# Patient Record
Sex: MALE | Race: WHITE | NOT HISPANIC OR LATINO | Employment: OTHER | URBAN - METROPOLITAN AREA
[De-identification: names, ages, dates, MRNs, and addresses within clinical notes are randomized per-mention and may not be internally consistent; named-entity substitution may affect disease eponyms.]

---

## 2017-01-24 DIAGNOSIS — Z95.5 S/P CORONARY ARTERY STENT PLACEMENT: ICD-10-CM

## 2017-01-24 DIAGNOSIS — I25.10 CORONARY ARTERY DISEASE INVOLVING NATIVE CORONARY ARTERY OF NATIVE HEART WITHOUT ANGINA PECTORIS: ICD-10-CM

## 2017-01-24 DIAGNOSIS — I25.5 CARDIOMYOPATHY, ISCHEMIC: ICD-10-CM

## 2017-01-24 DIAGNOSIS — E78.5 DYSLIPIDEMIA: ICD-10-CM

## 2017-01-24 DIAGNOSIS — I21.09: ICD-10-CM

## 2017-01-25 RX ORDER — ATORVASTATIN CALCIUM 20 MG/1
20 TABLET, FILM COATED ORAL DAILY
Qty: 90 TABLET | Refills: 3 | Status: SHIPPED | OUTPATIENT
Start: 2017-01-25 | End: 2017-06-22 | Stop reason: SDUPTHER

## 2017-02-27 ENCOUNTER — LAB VISIT (OUTPATIENT)
Dept: LAB | Facility: HOSPITAL | Age: 62
End: 2017-02-27
Attending: UROLOGY
Payer: MEDICARE

## 2017-02-27 DIAGNOSIS — N13.8 HYPERTROPHY OF PROSTATE WITH URINARY OBSTRUCTION AND OTHER LOWER URINARY TRACT SYMPTOMS (LUTS): Primary | ICD-10-CM

## 2017-02-27 DIAGNOSIS — N40.1 HYPERTROPHY OF PROSTATE WITH URINARY OBSTRUCTION AND OTHER LOWER URINARY TRACT SYMPTOMS (LUTS): Primary | ICD-10-CM

## 2017-02-27 LAB
COMPLEXED PSA SERPL-MCNC: 1.4 NG/ML
TESTOST SERPL-MCNC: 350 NG/DL

## 2017-02-27 PROCEDURE — 84153 ASSAY OF PSA TOTAL: CPT

## 2017-02-27 PROCEDURE — 84403 ASSAY OF TOTAL TESTOSTERONE: CPT

## 2017-02-27 PROCEDURE — 36415 COLL VENOUS BLD VENIPUNCTURE: CPT | Mod: PO

## 2017-06-19 ENCOUNTER — CLINICAL SUPPORT (OUTPATIENT)
Dept: CARDIOLOGY | Facility: CLINIC | Age: 62
End: 2017-06-19
Payer: MEDICARE

## 2017-06-19 ENCOUNTER — LAB VISIT (OUTPATIENT)
Dept: LAB | Facility: HOSPITAL | Age: 62
End: 2017-06-19
Attending: INTERNAL MEDICINE
Payer: MEDICARE

## 2017-06-19 DIAGNOSIS — I25.10 CORONARY ARTERY DISEASE INVOLVING NATIVE CORONARY ARTERY OF NATIVE HEART WITHOUT ANGINA PECTORIS: ICD-10-CM

## 2017-06-19 DIAGNOSIS — E78.5 DYSLIPIDEMIA: ICD-10-CM

## 2017-06-19 DIAGNOSIS — Z95.5 S/P CORONARY ARTERY STENT PLACEMENT: ICD-10-CM

## 2017-06-19 DIAGNOSIS — R55 SYNCOPE, UNSPECIFIED SYNCOPE TYPE: ICD-10-CM

## 2017-06-19 DIAGNOSIS — I25.5 CARDIOMYOPATHY, ISCHEMIC: ICD-10-CM

## 2017-06-19 LAB
ALBUMIN SERPL BCP-MCNC: 3.4 G/DL
ALP SERPL-CCNC: 59 U/L
ALT SERPL W/O P-5'-P-CCNC: 19 U/L
ANION GAP SERPL CALC-SCNC: 11 MMOL/L
AORTIC VALVE REGURGITATION: NORMAL
AST SERPL-CCNC: 26 U/L
BILIRUB SERPL-MCNC: 0.7 MG/DL
BUN SERPL-MCNC: 18 MG/DL
CALCIUM SERPL-MCNC: 9 MG/DL
CHLORIDE SERPL-SCNC: 106 MMOL/L
CHOLEST/HDLC SERPL: 4.3 {RATIO}
CO2 SERPL-SCNC: 25 MMOL/L
CREAT SERPL-MCNC: 1.2 MG/DL
DIASTOLIC DYSFUNCTION: NO
EST. GFR  (AFRICAN AMERICAN): >60 ML/MIN/1.73 M^2
EST. GFR  (NON AFRICAN AMERICAN): >60 ML/MIN/1.73 M^2
ESTIMATED PA SYSTOLIC PRESSURE: 24.4
GLUCOSE SERPL-MCNC: 147 MG/DL
HDL/CHOLESTEROL RATIO: 23.5 %
HDLC SERPL-MCNC: 166 MG/DL
HDLC SERPL-MCNC: 39 MG/DL
LDLC SERPL CALC-MCNC: 111 MG/DL
NONHDLC SERPL-MCNC: 127 MG/DL
POTASSIUM SERPL-SCNC: 4.5 MMOL/L
PROT SERPL-MCNC: 6.4 G/DL
RETIRED EF AND QEF - SEE NOTES: 55 (ref 55–65)
SODIUM SERPL-SCNC: 142 MMOL/L
TRICUSPID VALVE REGURGITATION: NORMAL
TRIGL SERPL-MCNC: 80 MG/DL

## 2017-06-19 PROCEDURE — 36415 COLL VENOUS BLD VENIPUNCTURE: CPT | Mod: PO

## 2017-06-19 PROCEDURE — 93306 TTE W/DOPPLER COMPLETE: CPT | Mod: S$GLB,,, | Performed by: INTERNAL MEDICINE

## 2017-06-19 PROCEDURE — 80053 COMPREHEN METABOLIC PANEL: CPT

## 2017-06-19 PROCEDURE — 80061 LIPID PANEL: CPT

## 2017-06-20 ENCOUNTER — TELEPHONE (OUTPATIENT)
Dept: CARDIOLOGY | Facility: CLINIC | Age: 62
End: 2017-06-20

## 2017-06-22 ENCOUNTER — OFFICE VISIT (OUTPATIENT)
Dept: CARDIOLOGY | Facility: CLINIC | Age: 62
End: 2017-06-22
Payer: MEDICARE

## 2017-06-22 VITALS
DIASTOLIC BLOOD PRESSURE: 80 MMHG | BODY MASS INDEX: 34.94 KG/M2 | HEIGHT: 70 IN | SYSTOLIC BLOOD PRESSURE: 138 MMHG | WEIGHT: 244.06 LBS | HEART RATE: 70 BPM

## 2017-06-22 DIAGNOSIS — E78.5 DYSLIPIDEMIA: ICD-10-CM

## 2017-06-22 DIAGNOSIS — I25.5 CARDIOMYOPATHY, ISCHEMIC: ICD-10-CM

## 2017-06-22 DIAGNOSIS — I21.09: ICD-10-CM

## 2017-06-22 DIAGNOSIS — I25.10 CORONARY ARTERY DISEASE INVOLVING NATIVE CORONARY ARTERY OF NATIVE HEART WITHOUT ANGINA PECTORIS: ICD-10-CM

## 2017-06-22 DIAGNOSIS — Z95.5 S/P CORONARY ARTERY STENT PLACEMENT: Primary | ICD-10-CM

## 2017-06-22 PROCEDURE — 99999 PR PBB SHADOW E&M-EST. PATIENT-LVL III: CPT | Mod: PBBFAC,,, | Performed by: INTERNAL MEDICINE

## 2017-06-22 PROCEDURE — 99214 OFFICE O/P EST MOD 30 MIN: CPT | Mod: S$GLB,,, | Performed by: INTERNAL MEDICINE

## 2017-06-22 PROCEDURE — 99499 UNLISTED E&M SERVICE: CPT | Mod: S$GLB,,, | Performed by: INTERNAL MEDICINE

## 2017-06-22 RX ORDER — NITROGLYCERIN 0.4 MG/1
0.4 TABLET SUBLINGUAL EVERY 5 MIN PRN
Qty: 25 TABLET | Refills: 5 | Status: SHIPPED | OUTPATIENT
Start: 2017-06-22 | End: 2018-07-25 | Stop reason: SDUPTHER

## 2017-06-22 RX ORDER — ATORVASTATIN CALCIUM 40 MG/1
40 TABLET, FILM COATED ORAL DAILY
Qty: 90 TABLET | Refills: 5 | Status: SHIPPED | OUTPATIENT
Start: 2017-06-22 | End: 2018-06-23 | Stop reason: SDUPTHER

## 2017-06-22 NOTE — PROGRESS NOTES
Patient, Tam Koo (MRN #[id number]), presented with a recorded BMI of 35.02 kg/m^2 and a documented comorbidity(s):  - Hyperlipidemia  to which the severe obesity is a contributing factor. This is consistent with the definition of severe obesity (BMI 35.0-35.9) with comorbidity (ICD-10 E66.01, Z68.35). The patient's severe obesity was monitored, evaluated, addressed and/or treated. This addendum to the medical record is made on 06/22/2017.

## 2017-06-22 NOTE — PROGRESS NOTES
Subjective:    Patient ID:  Tam Koo is a 61 y.o. male who presents for evaluation of No chief complaint on file.      HPI   Here for follow up of CAD-PCI/STEMI 9/2015/syncope. Patients states is doing well no chest pain, SOB or change in exertional tolerence. Patient does not exercise but remains very active with out change in exertional tolerance or chest pain. Very active.       Review of Systems   Constitution: Negative for decreased appetite and malaise/fatigue.   HENT: Negative for congestion and nosebleeds.    Eyes: Negative for blurred vision.   Cardiovascular: Negative for chest pain, claudication, cyanosis, dyspnea on exertion, irregular heartbeat, leg swelling, near-syncope, orthopnea, palpitations, paroxysmal nocturnal dyspnea and syncope.   Respiratory: Negative for cough and shortness of breath.    Endocrine: Negative for polyuria.   Hematologic/Lymphatic: Does not bruise/bleed easily.   Musculoskeletal: Negative for back pain, falls, joint pain, joint swelling, muscle cramps, muscle weakness and myalgias.   Gastrointestinal: Negative for bloating, abdominal pain, change in bowel habit, nausea and vomiting.   Genitourinary: Negative for urgency.   Neurological: Negative for dizziness, focal weakness and light-headedness.   Psychiatric/Behavioral: Negative for altered mental status.        Objective:    Physical Exam   Constitutional: He is oriented to person, place, and time. He appears well-developed and well-nourished.   Neck: Normal range of motion. Neck supple. No JVD present.   Cardiovascular: Normal rate, regular rhythm, normal heart sounds and intact distal pulses.    Pulses:       Carotid pulses are 2+ on the right side, and 2+ on the left side.       Radial pulses are 2+ on the right side, and 2+ on the left side.   Pulmonary/Chest: Effort normal and breath sounds normal.   Musculoskeletal: Normal range of motion. He exhibits no edema.   Neurological: He is alert and oriented to person,  place, and time.   Skin: Skin is warm and dry.   Psychiatric: He has a normal mood and affect. His speech is normal. Cognition and memory are normal.             ..    Chemistry        Component Value Date/Time     06/19/2017 0733    K 4.5 06/19/2017 0733     06/19/2017 0733    CO2 25 06/19/2017 0733    BUN 18 06/19/2017 0733    CREATININE 1.2 06/19/2017 0733     (H) 06/19/2017 0733        Component Value Date/Time    CALCIUM 9.0 06/19/2017 0733    ALKPHOS 59 06/19/2017 0733    AST 26 06/19/2017 0733    ALT 19 06/19/2017 0733    BILITOT 0.7 06/19/2017 0733            ..  Lab Results   Component Value Date    CHOL 166 06/19/2017    CHOL 150 06/28/2016    CHOL 266 (H) 04/15/2015     Lab Results   Component Value Date    HDL 39 (L) 06/19/2017    HDL 55 06/28/2016    HDL 40 04/15/2015     Lab Results   Component Value Date    LDLCALC 111.0 06/19/2017    LDLCALC 82.6 06/28/2016    LDLCALC 190.4 (H) 04/15/2015     Lab Results   Component Value Date    TRIG 80 06/19/2017    TRIG 62 06/28/2016    TRIG 178 (H) 04/15/2015     Lab Results   Component Value Date    CHOLHDL 23.5 06/19/2017    CHOLHDL 36.7 06/28/2016    CHOLHDL 15.0 (L) 04/15/2015     ..  Lab Results   Component Value Date    WBC 4.94 04/15/2015    HGB 14.2 04/15/2015    HCT 44.7 04/15/2015    MCV 90 04/15/2015     04/15/2015       Test(s) Reviewed  I have reviewed the following in detail:  [] Stress test   [] Angiography   [x] Echocardiogram   [x] Labs   [x] Other:       Assessment:         ICD-10-CM ICD-9-CM   1. S/P coronary artery stent placement - LAD 09/2015 Z95.5 V45.82   2. Coronary artery disease involving native coronary artery of native heart without angina pectoris I25.10 414.01   3. Dyslipidemia E78.5 272.4   4. Cardiomyopathy, ischemic I25.5 414.8     Problem List Items Addressed This Visit     CAD (coronary artery disease)    Overview     9/18 LAD- occluded (STEMI), CX, RCA- LI.         Cardiomyopathy, ischemic    Overview      Resolved EF now 55%         Dyslipidemia    S/P coronary artery stent placement - LAD 09/2015 - Primary    Overview     9/2015 STEMI- rebel BMS 3c20 post 3.5           Other Visit Diagnoses    None.          Plan:     Return to clinic 1 year   Aerobic exercise 5x's/wk. Heart healthy diet and risk factor modification.    See labs and med orders.  Increase lipitor

## 2017-06-26 DIAGNOSIS — E78.5 DYSLIPIDEMIA: ICD-10-CM

## 2017-06-26 DIAGNOSIS — I25.5 CARDIOMYOPATHY, ISCHEMIC: ICD-10-CM

## 2017-06-26 DIAGNOSIS — R55 SYNCOPE, UNSPECIFIED SYNCOPE TYPE: Primary | ICD-10-CM

## 2017-06-26 DIAGNOSIS — I25.10 CORONARY ARTERY DISEASE, ANGINA PRESENCE UNSPECIFIED, UNSPECIFIED VESSEL OR LESION TYPE, UNSPECIFIED WHETHER NATIVE OR TRANSPLANTED HEART: ICD-10-CM

## 2018-06-19 ENCOUNTER — LAB VISIT (OUTPATIENT)
Dept: LAB | Facility: HOSPITAL | Age: 63
End: 2018-06-19
Attending: INTERNAL MEDICINE
Payer: MEDICARE

## 2018-06-19 DIAGNOSIS — I25.10 CORONARY ARTERY DISEASE, ANGINA PRESENCE UNSPECIFIED, UNSPECIFIED VESSEL OR LESION TYPE, UNSPECIFIED WHETHER NATIVE OR TRANSPLANTED HEART: ICD-10-CM

## 2018-06-19 DIAGNOSIS — E78.5 DYSLIPIDEMIA: ICD-10-CM

## 2018-06-19 DIAGNOSIS — I25.5 CARDIOMYOPATHY, ISCHEMIC: ICD-10-CM

## 2018-06-19 DIAGNOSIS — R55 SYNCOPE, UNSPECIFIED SYNCOPE TYPE: ICD-10-CM

## 2018-06-19 LAB
ALBUMIN SERPL BCP-MCNC: 3.6 G/DL
ALP SERPL-CCNC: 65 U/L
ALT SERPL W/O P-5'-P-CCNC: 20 U/L
ANION GAP SERPL CALC-SCNC: 8 MMOL/L
AST SERPL-CCNC: 18 U/L
BASOPHILS # BLD AUTO: 0.04 K/UL
BASOPHILS NFR BLD: 0.5 %
BILIRUB SERPL-MCNC: 0.7 MG/DL
BUN SERPL-MCNC: 22 MG/DL
CALCIUM SERPL-MCNC: 9.7 MG/DL
CHLORIDE SERPL-SCNC: 104 MMOL/L
CHOLEST SERPL-MCNC: 186 MG/DL
CHOLEST/HDLC SERPL: 3.6 {RATIO}
CO2 SERPL-SCNC: 29 MMOL/L
CREAT SERPL-MCNC: 1 MG/DL
DIFFERENTIAL METHOD: ABNORMAL
EOSINOPHIL # BLD AUTO: 0.4 K/UL
EOSINOPHIL NFR BLD: 4.8 %
ERYTHROCYTE [DISTWIDTH] IN BLOOD BY AUTOMATED COUNT: 13.2 %
EST. GFR  (AFRICAN AMERICAN): >60 ML/MIN/1.73 M^2
EST. GFR  (NON AFRICAN AMERICAN): >60 ML/MIN/1.73 M^2
GLUCOSE SERPL-MCNC: 95 MG/DL
HCT VFR BLD AUTO: 44.9 %
HDLC SERPL-MCNC: 52 MG/DL
HDLC SERPL: 28 %
HGB BLD-MCNC: 14 G/DL
IMM GRANULOCYTES # BLD AUTO: 0.02 K/UL
IMM GRANULOCYTES NFR BLD AUTO: 0.3 %
LDLC SERPL CALC-MCNC: 116.8 MG/DL
LYMPHOCYTES # BLD AUTO: 2.6 K/UL
LYMPHOCYTES NFR BLD: 34.5 %
MCH RBC QN AUTO: 29.5 PG
MCHC RBC AUTO-ENTMCNC: 31.2 G/DL
MCV RBC AUTO: 95 FL
MONOCYTES # BLD AUTO: 0.7 K/UL
MONOCYTES NFR BLD: 9.6 %
NEUTROPHILS # BLD AUTO: 3.7 K/UL
NEUTROPHILS NFR BLD: 50.3 %
NONHDLC SERPL-MCNC: 134 MG/DL
NRBC BLD-RTO: 0 /100 WBC
PLATELET # BLD AUTO: 202 K/UL
PMV BLD AUTO: 11.3 FL
POTASSIUM SERPL-SCNC: 4.2 MMOL/L
PROT SERPL-MCNC: 6.9 G/DL
RBC # BLD AUTO: 4.75 M/UL
SODIUM SERPL-SCNC: 141 MMOL/L
TRIGL SERPL-MCNC: 86 MG/DL
WBC # BLD AUTO: 7.43 K/UL

## 2018-06-19 PROCEDURE — 80061 LIPID PANEL: CPT

## 2018-06-19 PROCEDURE — 36415 COLL VENOUS BLD VENIPUNCTURE: CPT | Mod: PO

## 2018-06-19 PROCEDURE — 85025 COMPLETE CBC W/AUTO DIFF WBC: CPT

## 2018-06-19 PROCEDURE — 80053 COMPREHEN METABOLIC PANEL: CPT

## 2018-06-25 RX ORDER — ATORVASTATIN CALCIUM 40 MG/1
TABLET, FILM COATED ORAL
Qty: 90 TABLET | Refills: 4 | Status: SHIPPED | OUTPATIENT
Start: 2018-06-25 | End: 2018-06-26 | Stop reason: SDUPTHER

## 2018-06-26 ENCOUNTER — OFFICE VISIT (OUTPATIENT)
Dept: CARDIOLOGY | Facility: CLINIC | Age: 63
End: 2018-06-26
Payer: MEDICARE

## 2018-06-26 VITALS
BODY MASS INDEX: 34.94 KG/M2 | HEART RATE: 71 BPM | DIASTOLIC BLOOD PRESSURE: 82 MMHG | WEIGHT: 244.06 LBS | HEIGHT: 70 IN | SYSTOLIC BLOOD PRESSURE: 131 MMHG

## 2018-06-26 DIAGNOSIS — Z95.5 S/P CORONARY ARTERY STENT PLACEMENT: Primary | ICD-10-CM

## 2018-06-26 DIAGNOSIS — E78.5 DYSLIPIDEMIA: ICD-10-CM

## 2018-06-26 DIAGNOSIS — I25.10 CORONARY ARTERY DISEASE INVOLVING NATIVE CORONARY ARTERY OF NATIVE HEART WITHOUT ANGINA PECTORIS: ICD-10-CM

## 2018-06-26 DIAGNOSIS — I25.5 CARDIOMYOPATHY, ISCHEMIC: ICD-10-CM

## 2018-06-26 PROCEDURE — 3079F DIAST BP 80-89 MM HG: CPT | Mod: CPTII,S$GLB,, | Performed by: INTERNAL MEDICINE

## 2018-06-26 PROCEDURE — 3075F SYST BP GE 130 - 139MM HG: CPT | Mod: CPTII,S$GLB,, | Performed by: INTERNAL MEDICINE

## 2018-06-26 PROCEDURE — 3008F BODY MASS INDEX DOCD: CPT | Mod: CPTII,S$GLB,, | Performed by: INTERNAL MEDICINE

## 2018-06-26 PROCEDURE — 99999 PR PBB SHADOW E&M-EST. PATIENT-LVL III: CPT | Mod: PBBFAC,,, | Performed by: INTERNAL MEDICINE

## 2018-06-26 PROCEDURE — 99214 OFFICE O/P EST MOD 30 MIN: CPT | Mod: S$GLB,,, | Performed by: INTERNAL MEDICINE

## 2018-06-26 RX ORDER — ATORVASTATIN CALCIUM 20 MG/1
TABLET, FILM COATED ORAL
Qty: 90 TABLET | Refills: 5 | Status: SHIPPED | OUTPATIENT
Start: 2018-06-26 | End: 2019-05-21 | Stop reason: SDUPTHER

## 2018-06-26 RX ORDER — EZETIMIBE 10 MG/1
10 TABLET ORAL DAILY
Qty: 90 TABLET | Refills: 3 | Status: SHIPPED | OUTPATIENT
Start: 2018-06-26 | End: 2019-05-21 | Stop reason: SDUPTHER

## 2018-06-26 NOTE — PROGRESS NOTES
Subjective:    Patient ID:  Tam Koo is a 62 y.o. male who presents for follow-up of Coronary Artery Disease (Annual f/u - review labs ) and Cardiomyopathy      HPI  Here for follow up of CAD-PCI/STEMI 9/2015. Active man occasional CP w/ and w/o exertion.     Review of Systems   Constitution: Negative for malaise/fatigue.   Eyes: Negative for blurred vision.   Cardiovascular: Negative for chest pain, claudication, cyanosis, dyspnea on exertion, irregular heartbeat, leg swelling, near-syncope, orthopnea, palpitations, paroxysmal nocturnal dyspnea and syncope.   Respiratory: Negative for cough and shortness of breath.    Hematologic/Lymphatic: Does not bruise/bleed easily.   Musculoskeletal: Negative for back pain, falls, joint pain, muscle cramps, muscle weakness and myalgias.   Gastrointestinal: Negative for abdominal pain, change in bowel habit, nausea and vomiting.   Genitourinary: Negative for urgency.   Neurological: Negative for dizziness, focal weakness and light-headedness.        Objective:    Physical Exam   Constitutional: He is oriented to person, place, and time. He appears well-developed and well-nourished.   Neck: Normal range of motion. Neck supple. No JVD present.   Cardiovascular: Normal rate, regular rhythm, normal heart sounds and intact distal pulses.    Pulses:       Carotid pulses are 2+ on the right side, and 2+ on the left side.       Radial pulses are 2+ on the right side, and 2+ on the left side.   Pulmonary/Chest: Effort normal and breath sounds normal.   Musculoskeletal: Normal range of motion. He exhibits no edema.   Neurological: He is alert and oriented to person, place, and time.   Skin: Skin is warm and dry.   Psychiatric: He has a normal mood and affect. His speech is normal. Cognition and memory are normal.             ..    Chemistry        Component Value Date/Time     06/19/2018 0812    K 4.2 06/19/2018 0812     06/19/2018 0812    CO2 29 06/19/2018 0812    BUN  22 06/19/2018 0812    CREATININE 1.0 06/19/2018 0812    GLU 95 06/19/2018 0812        Component Value Date/Time    CALCIUM 9.7 06/19/2018 0812    ALKPHOS 65 06/19/2018 0812    AST 18 06/19/2018 0812    ALT 20 06/19/2018 0812    BILITOT 0.7 06/19/2018 0812    ESTGFRAFRICA >60.0 06/19/2018 0812    EGFRNONAA >60.0 06/19/2018 0812            ..  Lab Results   Component Value Date    CHOL 186 06/19/2018    CHOL 166 06/19/2017    CHOL 150 06/28/2016     Lab Results   Component Value Date    HDL 52 06/19/2018    HDL 39 (L) 06/19/2017    HDL 55 06/28/2016     Lab Results   Component Value Date    LDLCALC 116.8 06/19/2018    LDLCALC 111.0 06/19/2017    LDLCALC 82.6 06/28/2016     Lab Results   Component Value Date    TRIG 86 06/19/2018    TRIG 80 06/19/2017    TRIG 62 06/28/2016     Lab Results   Component Value Date    CHOLHDL 28.0 06/19/2018    CHOLHDL 23.5 06/19/2017    CHOLHDL 36.7 06/28/2016     ..  Lab Results   Component Value Date    WBC 7.43 06/19/2018    HGB 14.0 06/19/2018    HCT 44.9 06/19/2018    MCV 95 06/19/2018     06/19/2018       Test(s) Reviewed  I have reviewed the following in detail:  [] Stress test   [] Angiography   [x] Echocardiogram   [x] Labs   [] Other:       Assessment:         ICD-10-CM ICD-9-CM   1. S/P coronary artery stent placement - LAD 09/2015 Z95.5 V45.82   2. Cardiomyopathy, ischemic I25.5 414.8   3. Dyslipidemia E78.5 272.4   4. Coronary artery disease involving native coronary artery of native heart without angina pectoris I25.10 414.01     Problem List Items Addressed This Visit     CAD (coronary artery disease)    Overview     9/18 LAD- occluded (STEMI), CX, RCA- LI.         Cardiomyopathy, ischemic    Overview     Resolved EF now 55%         Dyslipidemia    S/P coronary artery stent placement - LAD 09/2015 - Primary    Overview     9/2015 STEMI- rebel BMS 3c20 post 3.5                Plan:          Return to clinic 6 months   Aerobic exercise 5x's/wk. Heart healthy diet and  risk factor modification.    See labs and med orders.  Tm nuc stress due to CP increased JOHNSON  Add zetia decrease lipitor

## 2018-07-23 ENCOUNTER — HOSPITAL ENCOUNTER (OUTPATIENT)
Dept: RADIOLOGY | Facility: HOSPITAL | Age: 63
Discharge: HOME | End: 2018-07-23
Attending: INTERNAL MEDICINE
Payer: MEDICARE

## 2018-07-23 ENCOUNTER — CLINICAL SUPPORT (OUTPATIENT)
Dept: CARDIOLOGY | Facility: CLINIC | Age: 63
End: 2018-07-23
Attending: INTERNAL MEDICINE
Payer: MEDICARE

## 2018-07-23 VITALS — HEIGHT: 70 IN | WEIGHT: 244 LBS | BODY MASS INDEX: 34.93 KG/M2

## 2018-07-23 DIAGNOSIS — Z95.5 S/P CORONARY ARTERY STENT PLACEMENT: ICD-10-CM

## 2018-07-23 DIAGNOSIS — I25.5 CARDIOMYOPATHY, ISCHEMIC: ICD-10-CM

## 2018-07-23 DIAGNOSIS — I25.10 CORONARY ARTERY DISEASE INVOLVING NATIVE CORONARY ARTERY OF NATIVE HEART WITHOUT ANGINA PECTORIS: ICD-10-CM

## 2018-07-23 PROCEDURE — 93017 CV STRESS TEST TRACING ONLY: CPT | Mod: PBBFAC,PO

## 2018-07-23 PROCEDURE — 78452 HT MUSCLE IMAGE SPECT MULT: CPT | Mod: PO

## 2018-07-23 PROCEDURE — 93017 CV STRESS TEST TRACING ONLY: CPT | Mod: PBBFAC,PO | Performed by: INTERNAL MEDICINE

## 2018-07-23 PROCEDURE — 93016 CV STRESS TEST SUPVJ ONLY: CPT | Mod: S$PBB,,, | Performed by: INTERNAL MEDICINE

## 2018-07-23 PROCEDURE — 99999 PR PBB SHADOW E&M-EST. PATIENT-LVL I: CPT | Mod: PBBFAC,,,

## 2018-07-23 PROCEDURE — 93018 CV STRESS TEST I&R ONLY: CPT | Mod: S$PBB,,, | Performed by: INTERNAL MEDICINE

## 2018-07-23 PROCEDURE — 78452 HT MUSCLE IMAGE SPECT MULT: CPT | Mod: 26,,, | Performed by: INTERNAL MEDICINE

## 2018-07-24 LAB
CV STRESS BASE HR: 53
CVPEAKDIABP: 61
CVPEAKSYSBP: 179
CVRESTDIABP: 59
CVRESTSYSBP: 119
NUC REST DIASTOLIC VOLUME INDEX: 134
NUC REST EJECTION FRACTION: 39
NUC REST SYSTOLIC VOLUME INDEX: 81
STRESS ECHO POST ESTIMATED WORKLOAD: 11 METS
STRESS ECHO POST EXERCISE DUR MIN: 6 MIN
STRESS ECHO POST EXERCISE DUR SEC: 54

## 2018-07-26 RX ORDER — NITROGLYCERIN 0.4 MG/1
TABLET SUBLINGUAL
Qty: 25 TABLET | Refills: 5 | Status: SHIPPED | OUTPATIENT
Start: 2018-07-26 | End: 2020-03-12 | Stop reason: SDUPTHER

## 2019-02-08 ENCOUNTER — TELEPHONE (OUTPATIENT)
Dept: CARDIOLOGY | Facility: CLINIC | Age: 64
End: 2019-02-08

## 2019-02-08 NOTE — TELEPHONE ENCOUNTER
----- Message from William Leon sent at 2/8/2019 12:01 PM CST -----  Contact: self   Type:  Sooner Apoointment Request    Caller is requesting a sooner appointment.  Caller declined first available appointment listed below.  Caller will not accept being placed on the waitlist and is requesting a message be sent to doctor.    Name of Caller: patient   When is the first available appointment?  May   Symptoms:  Surgery clearance for gastro surgery   Best Call Back Number: [phone] (home)            Additional Information: patient need to by the end of March

## 2019-02-28 ENCOUNTER — TELEPHONE (OUTPATIENT)
Dept: CARDIOLOGY | Facility: CLINIC | Age: 64
End: 2019-02-28

## 2019-02-28 NOTE — TELEPHONE ENCOUNTER
----- Message from Jose Kothari sent at 2/28/2019  3:54 PM CST -----  Contact: pt  The Pt is requesting a call back to discuss the cardiac clearance that was requested. The pt states this has not yet been sent to the office that needed it. Please call to advise.     Call Back#: [phone]  Thanks

## 2019-02-28 NOTE — TELEPHONE ENCOUNTER
----- Message from Rick Persaud sent at 2/28/2019  9:42 AM CST -----  Type: Needs Medical Advice    Who Called:  Self   Symptoms (please be specific):  NA   How long has patient had these symptoms:  [facility]  Pharmacy name and phone #:  [facility] Best Call Back Number:  [phone] Information: Patient asking for clearance for gastro sleeve surgery. Patient asking to pickup the clearance.

## 2019-03-01 ENCOUNTER — LAB VISIT (OUTPATIENT)
Dept: LAB | Facility: HOSPITAL | Age: 64
End: 2019-03-01
Attending: SURGERY
Payer: MEDICARE

## 2019-03-01 DIAGNOSIS — Z13.21 SCREENING FOR MALNUTRITION: Primary | ICD-10-CM

## 2019-03-01 DIAGNOSIS — R53.83 FATIGUE: ICD-10-CM

## 2019-03-01 DIAGNOSIS — Z79.899 NEED FOR PROPHYLACTIC CHEMOTHERAPY: ICD-10-CM

## 2019-03-01 DIAGNOSIS — Z13.220 SCREENING FOR LIPOID DISORDERS: ICD-10-CM

## 2019-03-01 DIAGNOSIS — Z13.29 SCREENING FOR THYROID DISORDER: ICD-10-CM

## 2019-03-01 LAB
25(OH)D3+25(OH)D2 SERPL-MCNC: 26 NG/ML
ALBUMIN SERPL BCP-MCNC: 3.7 G/DL
ALP SERPL-CCNC: 69 U/L
ALT SERPL W/O P-5'-P-CCNC: 38 U/L
ANION GAP SERPL CALC-SCNC: 6 MMOL/L
AST SERPL-CCNC: 26 U/L
BASOPHILS # BLD AUTO: 0.04 K/UL
BASOPHILS NFR BLD: 0.5 %
BILIRUB SERPL-MCNC: 0.8 MG/DL
BUN SERPL-MCNC: 21 MG/DL
CALCIUM SERPL-MCNC: 9.5 MG/DL
CHLORIDE SERPL-SCNC: 102 MMOL/L
CHOLEST SERPL-MCNC: 183 MG/DL
CHOLEST/HDLC SERPL: 3.4 {RATIO}
CO2 SERPL-SCNC: 31 MMOL/L
CREAT SERPL-MCNC: 1.2 MG/DL
DIFFERENTIAL METHOD: ABNORMAL
EOSINOPHIL # BLD AUTO: 0.2 K/UL
EOSINOPHIL NFR BLD: 2.3 %
ERYTHROCYTE [DISTWIDTH] IN BLOOD BY AUTOMATED COUNT: 14.2 %
EST. GFR  (AFRICAN AMERICAN): >60 ML/MIN/1.73 M^2
EST. GFR  (NON AFRICAN AMERICAN): >60 ML/MIN/1.73 M^2
ESTIMATED AVG GLUCOSE: 126 MG/DL
FERRITIN SERPL-MCNC: 50 NG/ML
FOLATE SERPL-MCNC: 13.9 NG/ML
GLUCOSE SERPL-MCNC: 97 MG/DL
HBA1C MFR BLD HPLC: 6 %
HCT VFR BLD AUTO: 46.8 %
HDLC SERPL-MCNC: 54 MG/DL
HDLC SERPL: 29.5 %
HGB BLD-MCNC: 14.5 G/DL
IMM GRANULOCYTES # BLD AUTO: 0.03 K/UL
IMM GRANULOCYTES NFR BLD AUTO: 0.4 %
LDLC SERPL CALC-MCNC: 117.2 MG/DL
LYMPHOCYTES # BLD AUTO: 2.2 K/UL
LYMPHOCYTES NFR BLD: 28 %
MAGNESIUM SERPL-MCNC: 2.1 MG/DL
MCH RBC QN AUTO: 29.5 PG
MCHC RBC AUTO-ENTMCNC: 31 G/DL
MCV RBC AUTO: 95 FL
MONOCYTES # BLD AUTO: 0.6 K/UL
MONOCYTES NFR BLD: 7.7 %
NEUTROPHILS # BLD AUTO: 4.8 K/UL
NEUTROPHILS NFR BLD: 61.1 %
NONHDLC SERPL-MCNC: 129 MG/DL
NRBC BLD-RTO: 0 /100 WBC
PLATELET # BLD AUTO: 235 K/UL
PMV BLD AUTO: 10.7 FL
POTASSIUM SERPL-SCNC: 4.5 MMOL/L
PROT SERPL-MCNC: 7.1 G/DL
PTH-INTACT SERPL-MCNC: 63 PG/ML
RBC # BLD AUTO: 4.91 M/UL
SODIUM SERPL-SCNC: 139 MMOL/L
T4 FREE SERPL-MCNC: 0.98 NG/DL
TRIGL SERPL-MCNC: 59 MG/DL
TSH SERPL DL<=0.005 MIU/L-ACNC: 0.35 UIU/ML
VIT B12 SERPL-MCNC: 964 PG/ML
WBC # BLD AUTO: 7.89 K/UL

## 2019-03-01 PROCEDURE — 80061 LIPID PANEL: CPT

## 2019-03-01 PROCEDURE — 85025 COMPLETE CBC W/AUTO DIFF WBC: CPT

## 2019-03-01 PROCEDURE — 84425 ASSAY OF VITAMIN B-1: CPT

## 2019-03-01 PROCEDURE — 84439 ASSAY OF FREE THYROXINE: CPT

## 2019-03-01 PROCEDURE — 80053 COMPREHEN METABOLIC PANEL: CPT

## 2019-03-01 PROCEDURE — 36415 COLL VENOUS BLD VENIPUNCTURE: CPT | Mod: PO

## 2019-03-01 PROCEDURE — 83036 HEMOGLOBIN GLYCOSYLATED A1C: CPT

## 2019-03-01 PROCEDURE — 84443 ASSAY THYROID STIM HORMONE: CPT

## 2019-03-01 PROCEDURE — 83735 ASSAY OF MAGNESIUM: CPT

## 2019-03-01 PROCEDURE — 82306 VITAMIN D 25 HYDROXY: CPT

## 2019-03-01 PROCEDURE — 82607 VITAMIN B-12: CPT

## 2019-03-01 PROCEDURE — 82728 ASSAY OF FERRITIN: CPT

## 2019-03-01 PROCEDURE — 82746 ASSAY OF FOLIC ACID SERUM: CPT

## 2019-03-01 PROCEDURE — 83970 ASSAY OF PARATHORMONE: CPT

## 2019-03-07 LAB — VIT B1 BLD-MCNC: 80 UG/L (ref 38–122)

## 2019-04-10 ENCOUNTER — TELEPHONE (OUTPATIENT)
Dept: CARDIOLOGY | Facility: CLINIC | Age: 64
End: 2019-04-10

## 2019-04-10 NOTE — TELEPHONE ENCOUNTER
----- Message from Shyanne Barr sent at 4/10/2019  9:56 AM CDT -----    DR Castaneda is calling   For a  Dr  To  call / spoke with  levi Bonilla is in  The  Room  With  Pt  Asking for a  # to call   back [phone]  is calling to see if  The  Pt  Should  Be on a  Blood  Pressure  Med and  Has     Med  question

## 2019-05-21 ENCOUNTER — OFFICE VISIT (OUTPATIENT)
Dept: CARDIOLOGY | Facility: CLINIC | Age: 64
End: 2019-05-21
Payer: MEDICARE

## 2019-05-21 VITALS
SYSTOLIC BLOOD PRESSURE: 114 MMHG | DIASTOLIC BLOOD PRESSURE: 69 MMHG | HEART RATE: 76 BPM | WEIGHT: 255.94 LBS | HEIGHT: 70 IN | BODY MASS INDEX: 36.64 KG/M2

## 2019-05-21 DIAGNOSIS — E78.5 DYSLIPIDEMIA: ICD-10-CM

## 2019-05-21 DIAGNOSIS — Z95.5 S/P CORONARY ARTERY STENT PLACEMENT: Primary | ICD-10-CM

## 2019-05-21 DIAGNOSIS — I25.10 CORONARY ARTERY DISEASE INVOLVING NATIVE CORONARY ARTERY OF NATIVE HEART WITHOUT ANGINA PECTORIS: Primary | ICD-10-CM

## 2019-05-21 DIAGNOSIS — I25.5 CARDIOMYOPATHY, ISCHEMIC: ICD-10-CM

## 2019-05-21 DIAGNOSIS — I21.09: ICD-10-CM

## 2019-05-21 DIAGNOSIS — I25.10 CORONARY ARTERY DISEASE INVOLVING NATIVE CORONARY ARTERY OF NATIVE HEART WITHOUT ANGINA PECTORIS: ICD-10-CM

## 2019-05-21 PROCEDURE — 3074F SYST BP LT 130 MM HG: CPT | Mod: CPTII,S$GLB,, | Performed by: INTERNAL MEDICINE

## 2019-05-21 PROCEDURE — 99214 PR OFFICE/OUTPT VISIT, EST, LEVL IV, 30-39 MIN: ICD-10-PCS | Mod: S$GLB,,, | Performed by: INTERNAL MEDICINE

## 2019-05-21 PROCEDURE — 3008F BODY MASS INDEX DOCD: CPT | Mod: CPTII,S$GLB,, | Performed by: INTERNAL MEDICINE

## 2019-05-21 PROCEDURE — 99999 PR PBB SHADOW E&M-EST. PATIENT-LVL III: CPT | Mod: PBBFAC,,, | Performed by: INTERNAL MEDICINE

## 2019-05-21 PROCEDURE — 3078F PR MOST RECENT DIASTOLIC BLOOD PRESSURE < 80 MM HG: ICD-10-PCS | Mod: CPTII,S$GLB,, | Performed by: INTERNAL MEDICINE

## 2019-05-21 PROCEDURE — 3074F PR MOST RECENT SYSTOLIC BLOOD PRESSURE < 130 MM HG: ICD-10-PCS | Mod: CPTII,S$GLB,, | Performed by: INTERNAL MEDICINE

## 2019-05-21 PROCEDURE — 99999 PR PBB SHADOW E&M-EST. PATIENT-LVL III: ICD-10-PCS | Mod: PBBFAC,,, | Performed by: INTERNAL MEDICINE

## 2019-05-21 PROCEDURE — 99214 OFFICE O/P EST MOD 30 MIN: CPT | Mod: S$GLB,,, | Performed by: INTERNAL MEDICINE

## 2019-05-21 PROCEDURE — 3078F DIAST BP <80 MM HG: CPT | Mod: CPTII,S$GLB,, | Performed by: INTERNAL MEDICINE

## 2019-05-21 PROCEDURE — 3008F PR BODY MASS INDEX (BMI) DOCUMENTED: ICD-10-PCS | Mod: CPTII,S$GLB,, | Performed by: INTERNAL MEDICINE

## 2019-05-21 RX ORDER — ATORVASTATIN CALCIUM 40 MG/1
TABLET, FILM COATED ORAL
Qty: 90 TABLET | Refills: 5 | Status: SHIPPED | OUTPATIENT
Start: 2019-05-21 | End: 2020-03-12

## 2019-05-21 RX ORDER — EZETIMIBE 10 MG/1
10 TABLET ORAL DAILY
Qty: 90 TABLET | Refills: 3 | Status: SHIPPED | OUTPATIENT
Start: 2019-05-21 | End: 2020-07-04

## 2019-05-21 RX ORDER — METFORMIN HYDROCHLORIDE 500 MG/1
500 TABLET ORAL DAILY
COMMUNITY
End: 2022-08-16 | Stop reason: ALTCHOICE

## 2019-05-21 NOTE — PROGRESS NOTES
Subjective:    Patient ID:  Tam Koo is a 63 y.o. male who presents for follow-up of CAD F/U and Medication Refill      HPI  Here for follow up of CAD-PCI/STEMI 9/2015. Patients states is doing well no chest pain, SOB or change in exertional tolerence. Patient does not exercise but remains very active with out change in exertional tolerance or chest pain.     Review of Systems   Constitution: Negative for malaise/fatigue.   Eyes: Negative for blurred vision.   Cardiovascular: Negative for chest pain, claudication, cyanosis, dyspnea on exertion, irregular heartbeat, leg swelling, near-syncope, orthopnea, palpitations, paroxysmal nocturnal dyspnea and syncope.   Respiratory: Negative for cough and shortness of breath.    Hematologic/Lymphatic: Does not bruise/bleed easily.   Musculoskeletal: Negative for back pain, falls, joint pain, muscle cramps, muscle weakness and myalgias.   Gastrointestinal: Negative for abdominal pain, change in bowel habit, nausea and vomiting.   Genitourinary: Negative for urgency.   Neurological: Negative for dizziness, focal weakness and light-headedness.        Objective:    Physical Exam   Constitutional: He is oriented to person, place, and time. He appears well-developed and well-nourished.   Neck: Normal range of motion. Neck supple. No JVD present.   Cardiovascular: Normal rate, regular rhythm, normal heart sounds and intact distal pulses.   Pulses:       Carotid pulses are 2+ on the right side, and 2+ on the left side.       Radial pulses are 2+ on the right side, and 2+ on the left side.   Pulmonary/Chest: Effort normal and breath sounds normal.   Musculoskeletal: Normal range of motion. He exhibits no edema.   Neurological: He is alert and oriented to person, place, and time.   Skin: Skin is warm and dry.   Psychiatric: He has a normal mood and affect. His speech is normal. Cognition and memory are normal.               ..    Chemistry        Component Value Date/Time    NA  139 03/01/2019 1025    K 4.5 03/01/2019 1025     03/01/2019 1025    CO2 31 (H) 03/01/2019 1025    BUN 21 03/01/2019 1025    CREATININE 1.2 03/01/2019 1025    GLU 97 03/01/2019 1025        Component Value Date/Time    CALCIUM 9.5 03/01/2019 1025    ALKPHOS 69 03/01/2019 1025    AST 26 03/01/2019 1025    ALT 38 03/01/2019 1025    BILITOT 0.8 03/01/2019 1025    ESTGFRAFRICA >60.0 03/01/2019 1025    EGFRNONAA >60.0 03/01/2019 1025            ..  Lab Results   Component Value Date    CHOL 183 03/01/2019    CHOL 186 06/19/2018    CHOL 166 06/19/2017     Lab Results   Component Value Date    HDL 54 03/01/2019    HDL 52 06/19/2018    HDL 39 (L) 06/19/2017     Lab Results   Component Value Date    LDLCALC 117.2 03/01/2019    LDLCALC 116.8 06/19/2018    LDLCALC 111.0 06/19/2017     Lab Results   Component Value Date    TRIG 59 03/01/2019    TRIG 86 06/19/2018    TRIG 80 06/19/2017     Lab Results   Component Value Date    CHOLHDL 29.5 03/01/2019    CHOLHDL 28.0 06/19/2018    CHOLHDL 23.5 06/19/2017     ..  Lab Results   Component Value Date    WBC 7.89 03/01/2019    HGB 14.5 03/01/2019    HCT 46.8 03/01/2019    MCV 95 03/01/2019     03/01/2019       Test(s) Reviewed  I have reviewed the following in detail:  [] Stress test   [] Angiography   [x] Echocardiogram   [x] Labs   [x] Other:       Assessment:         ICD-10-CM ICD-9-CM   1. S/P coronary artery stent placement - LAD 09/2015 Z95.5 V45.82   2. Dyslipidemia E78.5 272.4   3. Coronary artery disease involving native coronary artery of native heart without angina pectoris I25.10 414.01   4. Cardiomyopathy, ischemic I25.5 414.8   5. Acute MI, anterolateral wall, subsequent episode of care I21.09 410.02     Problem List Items Addressed This Visit     S/P coronary artery stent placement - LAD 09/2015 - Primary    Overview     9/2015 STEMI- rebel BMS 3c20 post 3.5         Dyslipidemia    Cardiomyopathy, ischemic    Overview     Resolved EF now 55%         CAD  (coronary artery disease)    Overview     9/18 LAD- occluded (STEMI), CX, RCA- LI.         Acute MI, anterolateral wall, subsequent episode of care           Plan:           Return to clinic 9 months   Low level/low impact aerobic exercise 5x's/wk. Heart healthy diet and risk factor modification.    See labs and med orders.  Screening carotid US/abd Ao US next year   Patient low risk for surgery from CV standpoint. Continue BP meds kimberly-op.      Portions of this note may have been created with voice recognition software.  Grammatical, syntax and spelling errors may be inevitable.

## 2019-09-26 ENCOUNTER — LAB VISIT (OUTPATIENT)
Dept: LAB | Facility: HOSPITAL | Age: 64
End: 2019-09-26
Attending: SURGERY
Payer: MEDICARE

## 2019-09-26 DIAGNOSIS — R53.83 FATIGUE: Primary | ICD-10-CM

## 2019-09-26 DIAGNOSIS — Z13.220 SCREENING FOR LIPOID DISORDERS: ICD-10-CM

## 2019-09-26 DIAGNOSIS — Z13.29 SCREENING FOR THYROID DISORDER: ICD-10-CM

## 2019-09-26 DIAGNOSIS — Z13.21 SCREENING FOR MALNUTRITION: ICD-10-CM

## 2019-09-26 DIAGNOSIS — Z79.899 NEED FOR PROPHYLACTIC CHEMOTHERAPY: ICD-10-CM

## 2019-09-26 LAB
ALBUMIN SERPL BCP-MCNC: 3.8 G/DL (ref 3.5–5.2)
ALP SERPL-CCNC: 90 U/L (ref 55–135)
ALT SERPL W/O P-5'-P-CCNC: 59 U/L (ref 10–44)
ANION GAP SERPL CALC-SCNC: 6 MMOL/L (ref 8–16)
AST SERPL-CCNC: 37 U/L (ref 10–40)
BASOPHILS # BLD AUTO: 0.02 K/UL (ref 0–0.2)
BASOPHILS NFR BLD: 0.4 % (ref 0–1.9)
BILIRUB SERPL-MCNC: 0.8 MG/DL (ref 0.1–1)
BUN SERPL-MCNC: 24 MG/DL (ref 8–23)
CALCIUM SERPL-MCNC: 9.4 MG/DL (ref 8.7–10.5)
CHLORIDE SERPL-SCNC: 108 MMOL/L (ref 95–110)
CHOLEST SERPL-MCNC: 135 MG/DL (ref 120–199)
CHOLEST/HDLC SERPL: 3.5 {RATIO} (ref 2–5)
CO2 SERPL-SCNC: 31 MMOL/L (ref 23–29)
CREAT SERPL-MCNC: 1 MG/DL (ref 0.5–1.4)
DIFFERENTIAL METHOD: ABNORMAL
EOSINOPHIL # BLD AUTO: 0.2 K/UL (ref 0–0.5)
EOSINOPHIL NFR BLD: 4.9 % (ref 0–8)
ERYTHROCYTE [DISTWIDTH] IN BLOOD BY AUTOMATED COUNT: 13.7 % (ref 11.5–14.5)
EST. GFR  (AFRICAN AMERICAN): >60 ML/MIN/1.73 M^2
EST. GFR  (NON AFRICAN AMERICAN): >60 ML/MIN/1.73 M^2
ESTIMATED AVG GLUCOSE: 108 MG/DL (ref 68–131)
FERRITIN SERPL-MCNC: 77 NG/ML (ref 20–300)
FOLATE SERPL-MCNC: 15.1 NG/ML (ref 4–24)
GLUCOSE SERPL-MCNC: 90 MG/DL (ref 70–110)
HBA1C MFR BLD HPLC: 5.4 % (ref 4–5.6)
HCT VFR BLD AUTO: 44.3 % (ref 40–54)
HDLC SERPL-MCNC: 39 MG/DL (ref 40–75)
HDLC SERPL: 28.9 % (ref 20–50)
HGB BLD-MCNC: 13.3 G/DL (ref 14–18)
IMM GRANULOCYTES # BLD AUTO: 0 K/UL (ref 0–0.04)
IMM GRANULOCYTES NFR BLD AUTO: 0 % (ref 0–0.5)
LDLC SERPL CALC-MCNC: 81 MG/DL (ref 63–159)
LYMPHOCYTES # BLD AUTO: 1.6 K/UL (ref 1–4.8)
LYMPHOCYTES NFR BLD: 34.5 % (ref 18–48)
MAGNESIUM SERPL-MCNC: 2 MG/DL (ref 1.6–2.6)
MCH RBC QN AUTO: 28.9 PG (ref 27–31)
MCHC RBC AUTO-ENTMCNC: 30 G/DL (ref 32–36)
MCV RBC AUTO: 96 FL (ref 82–98)
MONOCYTES # BLD AUTO: 0.5 K/UL (ref 0.3–1)
MONOCYTES NFR BLD: 10 % (ref 4–15)
NEUTROPHILS # BLD AUTO: 2.4 K/UL (ref 1.8–7.7)
NEUTROPHILS NFR BLD: 50.2 % (ref 38–73)
NONHDLC SERPL-MCNC: 96 MG/DL
NRBC BLD-RTO: 0 /100 WBC
PLATELET # BLD AUTO: 152 K/UL (ref 150–350)
PMV BLD AUTO: 11.7 FL (ref 9.2–12.9)
POTASSIUM SERPL-SCNC: 4.1 MMOL/L (ref 3.5–5.1)
PROT SERPL-MCNC: 6.5 G/DL (ref 6–8.4)
PTH-INTACT SERPL-MCNC: 49 PG/ML (ref 9–77)
RBC # BLD AUTO: 4.61 M/UL (ref 4.6–6.2)
SODIUM SERPL-SCNC: 145 MMOL/L (ref 136–145)
TRIGL SERPL-MCNC: 75 MG/DL (ref 30–150)
TSH SERPL DL<=0.005 MIU/L-ACNC: 0.4 UIU/ML (ref 0.4–4)
VIT B12 SERPL-MCNC: 1383 PG/ML (ref 210–950)
WBC # BLD AUTO: 4.7 K/UL (ref 3.9–12.7)

## 2019-09-26 PROCEDURE — 85025 COMPLETE CBC W/AUTO DIFF WBC: CPT

## 2019-09-26 PROCEDURE — 83970 ASSAY OF PARATHORMONE: CPT

## 2019-09-26 PROCEDURE — 84443 ASSAY THYROID STIM HORMONE: CPT

## 2019-09-26 PROCEDURE — 80053 COMPREHEN METABOLIC PANEL: CPT

## 2019-09-26 PROCEDURE — 82746 ASSAY OF FOLIC ACID SERUM: CPT

## 2019-09-26 PROCEDURE — 82728 ASSAY OF FERRITIN: CPT

## 2019-09-26 PROCEDURE — 36415 COLL VENOUS BLD VENIPUNCTURE: CPT | Mod: PO

## 2019-09-26 PROCEDURE — 84425 ASSAY OF VITAMIN B-1: CPT

## 2019-09-26 PROCEDURE — 83735 ASSAY OF MAGNESIUM: CPT

## 2019-09-26 PROCEDURE — 83036 HEMOGLOBIN GLYCOSYLATED A1C: CPT

## 2019-09-26 PROCEDURE — 82607 VITAMIN B-12: CPT

## 2019-09-26 PROCEDURE — 80061 LIPID PANEL: CPT

## 2019-10-01 LAB — VIT B1 BLD-MCNC: 78 UG/L (ref 38–122)

## 2020-01-17 ENCOUNTER — LAB VISIT (OUTPATIENT)
Dept: LAB | Facility: HOSPITAL | Age: 65
End: 2020-01-17
Attending: SURGERY
Payer: MEDICARE

## 2020-01-17 DIAGNOSIS — Z79.899 ENCOUNTER FOR LONG-TERM (CURRENT) USE OF OTHER MEDICATIONS: ICD-10-CM

## 2020-01-17 DIAGNOSIS — Z13.220 SCREENING FOR LIPOID DISORDERS: ICD-10-CM

## 2020-01-17 DIAGNOSIS — R68.89 MECHANICAL AND MOTOR PROBLEMS WITH INTERNAL ORGANS: ICD-10-CM

## 2020-01-17 DIAGNOSIS — R53.83 FATIGUE: Primary | ICD-10-CM

## 2020-01-17 DIAGNOSIS — Z13.29 SCREENING FOR THYROID DISORDER: ICD-10-CM

## 2020-01-17 DIAGNOSIS — Z13.21 SCREENING FOR MALNUTRITION: ICD-10-CM

## 2020-01-17 LAB
ALBUMIN SERPL BCP-MCNC: 4 G/DL (ref 3.5–5.2)
ALP SERPL-CCNC: 91 U/L (ref 55–135)
ALT SERPL W/O P-5'-P-CCNC: 55 U/L (ref 10–44)
ANION GAP SERPL CALC-SCNC: 9 MMOL/L (ref 8–16)
AST SERPL-CCNC: 43 U/L (ref 10–40)
BASOPHILS # BLD AUTO: 0.03 K/UL (ref 0–0.2)
BASOPHILS NFR BLD: 0.5 % (ref 0–1.9)
BILIRUB SERPL-MCNC: 1 MG/DL (ref 0.1–1)
BUN SERPL-MCNC: 19 MG/DL (ref 8–23)
CALCIUM SERPL-MCNC: 9.4 MG/DL (ref 8.7–10.5)
CHLORIDE SERPL-SCNC: 106 MMOL/L (ref 95–110)
CHOLEST SERPL-MCNC: 131 MG/DL (ref 120–199)
CHOLEST/HDLC SERPL: 2.6 {RATIO} (ref 2–5)
CO2 SERPL-SCNC: 29 MMOL/L (ref 23–29)
CREAT SERPL-MCNC: 1.1 MG/DL (ref 0.5–1.4)
DIFFERENTIAL METHOD: ABNORMAL
EOSINOPHIL # BLD AUTO: 0.2 K/UL (ref 0–0.5)
EOSINOPHIL NFR BLD: 3.6 % (ref 0–8)
ERYTHROCYTE [DISTWIDTH] IN BLOOD BY AUTOMATED COUNT: 13.2 % (ref 11.5–14.5)
EST. GFR  (AFRICAN AMERICAN): >60 ML/MIN/1.73 M^2
EST. GFR  (NON AFRICAN AMERICAN): >60 ML/MIN/1.73 M^2
ESTIMATED AVG GLUCOSE: 114 MG/DL (ref 68–131)
FOLATE SERPL-MCNC: 15.1 NG/ML (ref 4–24)
GLUCOSE SERPL-MCNC: 78 MG/DL (ref 70–110)
HBA1C MFR BLD HPLC: 5.6 % (ref 4–5.6)
HCT VFR BLD AUTO: 45.5 % (ref 40–54)
HDLC SERPL-MCNC: 50 MG/DL (ref 40–75)
HDLC SERPL: 38.2 % (ref 20–50)
HGB BLD-MCNC: 13.9 G/DL (ref 14–18)
IMM GRANULOCYTES # BLD AUTO: 0.02 K/UL (ref 0–0.04)
IMM GRANULOCYTES NFR BLD AUTO: 0.4 % (ref 0–0.5)
LDLC SERPL CALC-MCNC: 71.2 MG/DL (ref 63–159)
LYMPHOCYTES # BLD AUTO: 2.4 K/UL (ref 1–4.8)
LYMPHOCYTES NFR BLD: 42.5 % (ref 18–48)
MAGNESIUM SERPL-MCNC: 2.2 MG/DL (ref 1.6–2.6)
MCH RBC QN AUTO: 29.6 PG (ref 27–31)
MCHC RBC AUTO-ENTMCNC: 30.5 G/DL (ref 32–36)
MCV RBC AUTO: 97 FL (ref 82–98)
MONOCYTES # BLD AUTO: 0.5 K/UL (ref 0.3–1)
MONOCYTES NFR BLD: 8.5 % (ref 4–15)
NEUTROPHILS # BLD AUTO: 2.5 K/UL (ref 1.8–7.7)
NEUTROPHILS NFR BLD: 44.5 % (ref 38–73)
NONHDLC SERPL-MCNC: 81 MG/DL
NRBC BLD-RTO: 0 /100 WBC
PLATELET # BLD AUTO: 200 K/UL (ref 150–350)
PMV BLD AUTO: 11.5 FL (ref 9.2–12.9)
POTASSIUM SERPL-SCNC: 4.1 MMOL/L (ref 3.5–5.1)
PROT SERPL-MCNC: 6.9 G/DL (ref 6–8.4)
PTH-INTACT SERPL-MCNC: 72 PG/ML (ref 9–77)
RBC # BLD AUTO: 4.69 M/UL (ref 4.6–6.2)
SODIUM SERPL-SCNC: 144 MMOL/L (ref 136–145)
TRIGL SERPL-MCNC: 49 MG/DL (ref 30–150)
TSH SERPL DL<=0.005 MIU/L-ACNC: 0.59 UIU/ML (ref 0.4–4)
VIT B12 SERPL-MCNC: 1330 PG/ML (ref 210–950)
WBC # BLD AUTO: 5.53 K/UL (ref 3.9–12.7)

## 2020-01-17 PROCEDURE — 83970 ASSAY OF PARATHORMONE: CPT

## 2020-01-17 PROCEDURE — 80061 LIPID PANEL: CPT

## 2020-01-17 PROCEDURE — 83735 ASSAY OF MAGNESIUM: CPT

## 2020-01-17 PROCEDURE — 80053 COMPREHEN METABOLIC PANEL: CPT

## 2020-01-17 PROCEDURE — 85025 COMPLETE CBC W/AUTO DIFF WBC: CPT

## 2020-01-17 PROCEDURE — 84630 ASSAY OF ZINC: CPT

## 2020-01-17 PROCEDURE — 82746 ASSAY OF FOLIC ACID SERUM: CPT

## 2020-01-17 PROCEDURE — 83036 HEMOGLOBIN GLYCOSYLATED A1C: CPT

## 2020-01-17 PROCEDURE — 84425 ASSAY OF VITAMIN B-1: CPT

## 2020-01-17 PROCEDURE — 82607 VITAMIN B-12: CPT

## 2020-01-17 PROCEDURE — 84443 ASSAY THYROID STIM HORMONE: CPT

## 2020-01-20 LAB — ZINC SERPL-MCNC: 64 UG/DL (ref 60–130)

## 2020-01-23 LAB — VIT B1 BLD-MCNC: 70 UG/L (ref 38–122)

## 2020-02-10 ENCOUNTER — CLINICAL SUPPORT (OUTPATIENT)
Dept: CARDIOLOGY | Facility: CLINIC | Age: 65
End: 2020-02-10
Attending: INTERNAL MEDICINE
Payer: MEDICARE

## 2020-02-10 ENCOUNTER — LAB VISIT (OUTPATIENT)
Dept: LAB | Facility: HOSPITAL | Age: 65
End: 2020-02-10
Attending: INTERNAL MEDICINE
Payer: MEDICARE

## 2020-02-10 VITALS — WEIGHT: 255 LBS | HEIGHT: 70 IN | BODY MASS INDEX: 36.51 KG/M2

## 2020-02-10 DIAGNOSIS — I25.10 CORONARY ARTERY DISEASE INVOLVING NATIVE CORONARY ARTERY OF NATIVE HEART WITHOUT ANGINA PECTORIS: ICD-10-CM

## 2020-02-10 DIAGNOSIS — I25.5 CARDIOMYOPATHY, ISCHEMIC: ICD-10-CM

## 2020-02-10 DIAGNOSIS — Z95.5 S/P CORONARY ARTERY STENT PLACEMENT: ICD-10-CM

## 2020-02-10 DIAGNOSIS — E78.5 DYSLIPIDEMIA: ICD-10-CM

## 2020-02-10 LAB
ALBUMIN SERPL BCP-MCNC: 3.5 G/DL (ref 3.5–5.2)
ALP SERPL-CCNC: 82 U/L (ref 55–135)
ALT SERPL W/O P-5'-P-CCNC: 45 U/L (ref 10–44)
ANION GAP SERPL CALC-SCNC: 6 MMOL/L (ref 8–16)
AST SERPL-CCNC: 43 U/L (ref 10–40)
BASOPHILS # BLD AUTO: 0.03 K/UL (ref 0–0.2)
BASOPHILS NFR BLD: 0.5 % (ref 0–1.9)
BILIRUB SERPL-MCNC: 0.9 MG/DL (ref 0.1–1)
BUN SERPL-MCNC: 15 MG/DL (ref 8–23)
CALCIUM SERPL-MCNC: 8.9 MG/DL (ref 8.7–10.5)
CHLORIDE SERPL-SCNC: 108 MMOL/L (ref 95–110)
CHOLEST SERPL-MCNC: 128 MG/DL (ref 120–199)
CHOLEST/HDLC SERPL: 2.6 {RATIO} (ref 2–5)
CO2 SERPL-SCNC: 30 MMOL/L (ref 23–29)
CREAT SERPL-MCNC: 1.1 MG/DL (ref 0.5–1.4)
DIFFERENTIAL METHOD: ABNORMAL
EOSINOPHIL # BLD AUTO: 0.2 K/UL (ref 0–0.5)
EOSINOPHIL NFR BLD: 3.2 % (ref 0–8)
ERYTHROCYTE [DISTWIDTH] IN BLOOD BY AUTOMATED COUNT: 13.4 % (ref 11.5–14.5)
EST. GFR  (AFRICAN AMERICAN): >60 ML/MIN/1.73 M^2
EST. GFR  (NON AFRICAN AMERICAN): >60 ML/MIN/1.73 M^2
GLUCOSE SERPL-MCNC: 90 MG/DL (ref 70–110)
HCT VFR BLD AUTO: 43.6 % (ref 40–54)
HDLC SERPL-MCNC: 50 MG/DL (ref 40–75)
HDLC SERPL: 39.1 % (ref 20–50)
HGB BLD-MCNC: 13.4 G/DL (ref 14–18)
IMM GRANULOCYTES # BLD AUTO: 0.01 K/UL (ref 0–0.04)
IMM GRANULOCYTES NFR BLD AUTO: 0.2 % (ref 0–0.5)
LDLC SERPL CALC-MCNC: 69.4 MG/DL (ref 63–159)
LYMPHOCYTES # BLD AUTO: 2.1 K/UL (ref 1–4.8)
LYMPHOCYTES NFR BLD: 33 % (ref 18–48)
MCH RBC QN AUTO: 29.5 PG (ref 27–31)
MCHC RBC AUTO-ENTMCNC: 30.7 G/DL (ref 32–36)
MCV RBC AUTO: 96 FL (ref 82–98)
MONOCYTES # BLD AUTO: 0.6 K/UL (ref 0.3–1)
MONOCYTES NFR BLD: 9.1 % (ref 4–15)
NEUTROPHILS # BLD AUTO: 3.5 K/UL (ref 1.8–7.7)
NEUTROPHILS NFR BLD: 54 % (ref 38–73)
NONHDLC SERPL-MCNC: 78 MG/DL
NRBC BLD-RTO: 0 /100 WBC
PLATELET # BLD AUTO: 182 K/UL (ref 150–350)
PMV BLD AUTO: 11.1 FL (ref 9.2–12.9)
POTASSIUM SERPL-SCNC: 4.7 MMOL/L (ref 3.5–5.1)
PROT SERPL-MCNC: 6.4 G/DL (ref 6–8.4)
RBC # BLD AUTO: 4.54 M/UL (ref 4.6–6.2)
SODIUM SERPL-SCNC: 144 MMOL/L (ref 136–145)
TRIGL SERPL-MCNC: 43 MG/DL (ref 30–150)
WBC # BLD AUTO: 6.49 K/UL (ref 3.9–12.7)

## 2020-02-10 PROCEDURE — 93306 TRANSTHORACIC ECHO (TTE) COMPLETE (CUPID ONLY): ICD-10-PCS | Mod: S$GLB,,, | Performed by: INTERNAL MEDICINE

## 2020-02-10 PROCEDURE — 99999 PR PBB SHADOW E&M-EST. PATIENT-LVL I: CPT | Mod: PBBFAC,,,

## 2020-02-10 PROCEDURE — 93306 TTE W/DOPPLER COMPLETE: CPT | Mod: S$GLB,,, | Performed by: INTERNAL MEDICINE

## 2020-02-10 PROCEDURE — 85025 COMPLETE CBC W/AUTO DIFF WBC: CPT

## 2020-02-10 PROCEDURE — 93880 EXTRACRANIAL BILAT STUDY: CPT | Mod: S$GLB,,, | Performed by: INTERNAL MEDICINE

## 2020-02-10 PROCEDURE — 93978 VASCULAR STUDY: CPT | Mod: S$GLB,,, | Performed by: INTERNAL MEDICINE

## 2020-02-10 PROCEDURE — 93978 CV US ABDOMINAL AORTA EVALUATION (CUPID ONLY): ICD-10-PCS | Mod: S$GLB,,, | Performed by: INTERNAL MEDICINE

## 2020-02-10 PROCEDURE — 80053 COMPREHEN METABOLIC PANEL: CPT

## 2020-02-10 PROCEDURE — 99999 PR PBB SHADOW E&M-EST. PATIENT-LVL I: ICD-10-PCS | Mod: PBBFAC,,,

## 2020-02-10 PROCEDURE — 80061 LIPID PANEL: CPT

## 2020-02-10 PROCEDURE — 93880 CV US DOPPLER CAROTID (CUPID ONLY): ICD-10-PCS | Mod: S$GLB,,, | Performed by: INTERNAL MEDICINE

## 2020-02-10 PROCEDURE — 36415 COLL VENOUS BLD VENIPUNCTURE: CPT | Mod: PO

## 2020-02-11 LAB
ABDOMINAL IMA AP: 1.7 CM
ABDOMINAL IMA ED VEL: 0 CM/S
ABDOMINAL IMA PS VEL: 67 CM/S
ABDOMINAL IMA TRANS: 1.7 CM
ABDOMINAL INFRARENAL AORTA AP: 1.7 CM
ABDOMINAL INFRARENAL AORTA ED VEL: 0 CM/S
ABDOMINAL INFRARENAL AORTA PS VEL: 76 CM/S
ABDOMINAL INFRARENAL AORTA TRANS: 1.7 CM
ABDOMINAL JUXTARENAL AORTA AP: 2 CM
ABDOMINAL JUXTARENAL AORTA ED VEL: 19 CM/S
ABDOMINAL JUXTARENAL AORTA PS VEL: 109 CM/S
ABDOMINAL JUXTARENAL AORTA TRANS: 2 CM
ABDOMINAL LT COM ILIAC AP: 1.2 CM
ABDOMINAL LT COM ILIAC TRANS: 1.2 CM
ABDOMINAL LT COM ILIAC VEL: 85 CM/S
ABDOMINAL RT COM ILIAC AP: 0.9 CM
ABDOMINAL RT COM ILIAC TRANS: 0.9 CM
ABDOMINAL RT COM ILIAC VEL: 103 CM/S
ABDOMINAL SUPRARENAL AORTA AP: 2.6 CM
ABDOMINAL SUPRARENAL AORTA ED VEL: 17 CM/S
ABDOMINAL SUPRARENAL AORTA PS VEL: 83 CM/S
ABDOMINAL SUPRARENAL AORTA TRANS: 2.8 CM
ASCENDING AORTA: 3.11 CM
AV INDEX (PROSTH): 0.66
AV MEAN GRADIENT: 5 MMHG
AV PEAK GRADIENT: 8 MMHG
AV VALVE AREA: 2.61 CM2
AV VELOCITY RATIO: 0.71
BSA FOR ECHO PROCEDURE: 2.39 M2
CV ECHO LV RWT: 0.28 CM
DOP CALC AO PEAK VEL: 1.4 M/S
DOP CALC AO VTI: 30.85 CM
DOP CALC LVOT AREA: 4 CM2
DOP CALC LVOT DIAMETER: 2.25 CM
DOP CALC LVOT PEAK VEL: 1 M/S
DOP CALC LVOT STROKE VOLUME: 80.55 CM3
DOP CALCLVOT PEAK VEL VTI: 20.27 CM
E WAVE DECELERATION TIME: 168.61 MSEC
E/A RATIO: 1.27
E/E' RATIO: 8.78 M/S
ECHO LV POSTERIOR WALL: 0.75 CM (ref 0.6–1.1)
FRACTIONAL SHORTENING: 28 % (ref 28–44)
INTERVENTRICULAR SEPTUM: 0.83 CM (ref 0.6–1.1)
IVRT: 0.06 MSEC
LA MAJOR: 4.98 CM
LA MINOR: 4.68 CM
LA WIDTH: 4.26 CM
LEFT ARM DIASTOLIC BLOOD PRESSURE: 60 MMHG
LEFT ARM SYSTOLIC BLOOD PRESSURE: 114 MMHG
LEFT ATRIUM SIZE: 3.27 CM
LEFT ATRIUM VOLUME INDEX: 24.7 ML/M2
LEFT ATRIUM VOLUME: 57.14 CM3
LEFT CBA DIAS: 17 CM/S
LEFT CBA SYS: 71 CM/S
LEFT CCA DIST DIAS: 21 CM/S
LEFT CCA DIST SYS: 81 CM/S
LEFT CCA MID DIAS: 26 CM/S
LEFT CCA MID SYS: 103 CM/S
LEFT CCA PROX DIAS: 22 CM/S
LEFT CCA PROX SYS: 96 CM/S
LEFT ECA DIAS: 21 CM/S
LEFT ECA SYS: 150 CM/S
LEFT ICA DIST DIAS: 21 CM/S
LEFT ICA DIST SYS: 64 CM/S
LEFT ICA MID DIAS: 25 CM/S
LEFT ICA MID SYS: 95 CM/S
LEFT ICA PROX DIAS: 16 CM/S
LEFT ICA PROX SYS: 70 CM/S
LEFT INTERNAL DIMENSION IN SYSTOLE: 3.79 CM (ref 2.1–4)
LEFT VENTRICLE DIASTOLIC VOLUME INDEX: 57.82 ML/M2
LEFT VENTRICLE DIASTOLIC VOLUME: 133.81 ML
LEFT VENTRICLE MASS INDEX: 63 G/M2
LEFT VENTRICLE SYSTOLIC VOLUME INDEX: 26.7 ML/M2
LEFT VENTRICLE SYSTOLIC VOLUME: 61.73 ML
LEFT VENTRICULAR INTERNAL DIMENSION IN DIASTOLE: 5.27 CM (ref 3.5–6)
LEFT VENTRICULAR MASS: 146.25 G
LEFT VERTEBRAL DIAS: 14 CM/S
LEFT VERTEBRAL SYS: 49 CM/S
LV LATERAL E/E' RATIO: 8.78 M/S
LV SEPTAL E/E' RATIO: 8.78 M/S
MV PEAK A VEL: 0.62 M/S
MV PEAK E VEL: 0.79 M/S
OHS CV CAROTID RIGHT ICA EDV HIGHEST: 19
OHS CV CAROTID ULTRASOUND LEFT ICA/CCA RATIO: 0.92
OHS CV CAROTID ULTRASOUND RIGHT ICA/CCA RATIO: 0.57
OHS CV PV CAROTID LEFT HIGHEST CCA: 103
OHS CV PV CAROTID LEFT HIGHEST ICA: 95
OHS CV PV CAROTID RIGHT HIGHEST CCA: 148
OHS CV PV CAROTID RIGHT HIGHEST ICA: 84
OHS CV US CAROTID LEFT HIGHEST EDV: 25
PISA TR MAX VEL: 2.35 M/S
PULM VEIN S/D RATIO: 0.98
PV PEAK D VEL: 0.4 M/S
PV PEAK S VEL: 0.39 M/S
RA MAJOR: 4.83 CM
RA PRESSURE: 3 MMHG
RA WIDTH: 3.98 CM
RIGHT ARM DIASTOLIC BLOOD PRESSURE: 60 MMHG
RIGHT ARM SYSTOLIC BLOOD PRESSURE: 114 MMHG
RIGHT CBA DIAS: 19 CM/S
RIGHT CBA SYS: 66 CM/S
RIGHT CCA DIST DIAS: 17 CM/S
RIGHT CCA DIST SYS: 71 CM/S
RIGHT CCA MID DIAS: 19 CM/S
RIGHT CCA MID SYS: 79 CM/S
RIGHT CCA PROX DIAS: 26 CM/S
RIGHT CCA PROX SYS: 148 CM/S
RIGHT ECA DIAS: 6 CM/S
RIGHT ECA SYS: 120 CM/S
RIGHT ICA DIST DIAS: 17 CM/S
RIGHT ICA DIST SYS: 84 CM/S
RIGHT ICA MID DIAS: 17 CM/S
RIGHT ICA MID SYS: 79 CM/S
RIGHT ICA PROX DIAS: 19 CM/S
RIGHT ICA PROX SYS: 82 CM/S
RIGHT VENTRICULAR END-DIASTOLIC DIMENSION: 4.05 CM
RIGHT VERTEBRAL DIAS: 15 CM/S
RIGHT VERTEBRAL SYS: 64 CM/S
RV TISSUE DOPPLER FREE WALL SYSTOLIC VELOCITY 1 (APICAL 4 CHAMBER VIEW): 19.04 CM/S
SINUS: 3.55 CM
STJ: 2.93 CM
TDI LATERAL: 0.09 M/S
TDI SEPTAL: 0.09 M/S
TDI: 0.09 M/S
TR MAX PG: 22 MMHG
TRICUSPID ANNULAR PLANE SYSTOLIC EXCURSION: 2.73 CM
TV REST PULMONARY ARTERY PRESSURE: 25 MMHG

## 2020-03-12 ENCOUNTER — OFFICE VISIT (OUTPATIENT)
Dept: CARDIOLOGY | Facility: CLINIC | Age: 65
End: 2020-03-12
Attending: INTERNAL MEDICINE
Payer: MEDICARE

## 2020-03-12 VITALS
HEIGHT: 70 IN | HEART RATE: 64 BPM | WEIGHT: 201.75 LBS | BODY MASS INDEX: 28.88 KG/M2 | SYSTOLIC BLOOD PRESSURE: 100 MMHG | DIASTOLIC BLOOD PRESSURE: 65 MMHG

## 2020-03-12 DIAGNOSIS — I25.5 CARDIOMYOPATHY, ISCHEMIC: Primary | ICD-10-CM

## 2020-03-12 DIAGNOSIS — I25.10 CORONARY ARTERY DISEASE INVOLVING NATIVE CORONARY ARTERY OF NATIVE HEART WITHOUT ANGINA PECTORIS: ICD-10-CM

## 2020-03-12 DIAGNOSIS — Z95.5 S/P CORONARY ARTERY STENT PLACEMENT: ICD-10-CM

## 2020-03-12 DIAGNOSIS — I25.2 HISTORY OF ACUTE ANTERIOR WALL MI: ICD-10-CM

## 2020-03-12 DIAGNOSIS — E78.5 DYSLIPIDEMIA: ICD-10-CM

## 2020-03-12 PROCEDURE — 99214 OFFICE O/P EST MOD 30 MIN: CPT | Mod: S$GLB,,, | Performed by: INTERNAL MEDICINE

## 2020-03-12 PROCEDURE — 3074F PR MOST RECENT SYSTOLIC BLOOD PRESSURE < 130 MM HG: ICD-10-PCS | Mod: CPTII,S$GLB,, | Performed by: INTERNAL MEDICINE

## 2020-03-12 PROCEDURE — 3008F BODY MASS INDEX DOCD: CPT | Mod: CPTII,S$GLB,, | Performed by: INTERNAL MEDICINE

## 2020-03-12 PROCEDURE — 3078F PR MOST RECENT DIASTOLIC BLOOD PRESSURE < 80 MM HG: ICD-10-PCS | Mod: CPTII,S$GLB,, | Performed by: INTERNAL MEDICINE

## 2020-03-12 PROCEDURE — 99214 PR OFFICE/OUTPT VISIT, EST, LEVL IV, 30-39 MIN: ICD-10-PCS | Mod: S$GLB,,, | Performed by: INTERNAL MEDICINE

## 2020-03-12 PROCEDURE — 3078F DIAST BP <80 MM HG: CPT | Mod: CPTII,S$GLB,, | Performed by: INTERNAL MEDICINE

## 2020-03-12 PROCEDURE — 3074F SYST BP LT 130 MM HG: CPT | Mod: CPTII,S$GLB,, | Performed by: INTERNAL MEDICINE

## 2020-03-12 PROCEDURE — 1101F PR PT FALLS ASSESS DOC 0-1 FALLS W/OUT INJ PAST YR: ICD-10-PCS | Mod: CPTII,S$GLB,, | Performed by: INTERNAL MEDICINE

## 2020-03-12 PROCEDURE — 99999 PR PBB SHADOW E&M-EST. PATIENT-LVL III: CPT | Mod: PBBFAC,,, | Performed by: INTERNAL MEDICINE

## 2020-03-12 PROCEDURE — 1101F PT FALLS ASSESS-DOCD LE1/YR: CPT | Mod: CPTII,S$GLB,, | Performed by: INTERNAL MEDICINE

## 2020-03-12 PROCEDURE — 99999 PR PBB SHADOW E&M-EST. PATIENT-LVL III: ICD-10-PCS | Mod: PBBFAC,,, | Performed by: INTERNAL MEDICINE

## 2020-03-12 PROCEDURE — 3008F PR BODY MASS INDEX (BMI) DOCUMENTED: ICD-10-PCS | Mod: CPTII,S$GLB,, | Performed by: INTERNAL MEDICINE

## 2020-03-12 RX ORDER — NITROGLYCERIN 0.4 MG/1
TABLET SUBLINGUAL
Qty: 25 TABLET | Refills: 5 | Status: SHIPPED | OUTPATIENT
Start: 2020-03-12 | End: 2023-01-01 | Stop reason: SDUPTHER

## 2020-03-12 RX ORDER — ROSUVASTATIN CALCIUM 10 MG/1
10 TABLET, COATED ORAL NIGHTLY
Qty: 90 TABLET | Refills: 4 | Status: SHIPPED | OUTPATIENT
Start: 2020-03-12 | End: 2020-09-17 | Stop reason: SDUPTHER

## 2020-03-12 NOTE — PROGRESS NOTES
Subjective:    Patient ID:  Tam Koo is a 64 y.o. male who presents for evaluation of No chief complaint on file.      HPI  Here for follow up of CAD-PCI/STEMI 9/2015. Patients states is doing well no chest pain, SOB or change in exertional tolerence.   Patient is exercising regularly with out symptoms.      Review of Systems   Constitution: Negative for malaise/fatigue.   Eyes: Negative for blurred vision.   Cardiovascular: Negative for chest pain, claudication, cyanosis, dyspnea on exertion, irregular heartbeat, leg swelling, near-syncope, orthopnea, palpitations, paroxysmal nocturnal dyspnea and syncope.   Respiratory: Negative for cough and shortness of breath.    Hematologic/Lymphatic: Does not bruise/bleed easily.   Musculoskeletal: Negative for back pain, falls, joint pain, muscle cramps, muscle weakness and myalgias.   Gastrointestinal: Negative for abdominal pain, change in bowel habit, nausea and vomiting.   Genitourinary: Negative for urgency.   Neurological: Negative for dizziness, focal weakness and light-headedness.       Past Medical History:   Diagnosis Date    History of acute anterior wall MI 3/12/2020    2015    Kidney stone      Patient Active Problem List   Diagnosis    CAD (coronary artery disease)    S/P coronary artery stent placement - LAD 09/2015    Dyslipidemia    Cardiomyopathy, ischemic    Syncopal episodes    History of acute anterior wall MI        Objective:     Vitals:    03/12/20 1037   BP: 100/65   Pulse: 64        Physical Exam   Constitutional: He is oriented to person, place, and time. He appears well-developed and well-nourished.   Neck: Normal range of motion. Neck supple. No JVD present.   Cardiovascular: Normal rate, regular rhythm, normal heart sounds and intact distal pulses.   Pulses:       Carotid pulses are 2+ on the right side, and 2+ on the left side.       Radial pulses are 2+ on the right side, and 2+ on the left side.   Pulmonary/Chest: Effort normal  and breath sounds normal.   Musculoskeletal: Normal range of motion. He exhibits no edema.   Neurological: He is alert and oriented to person, place, and time.   Skin: Skin is warm and dry.   Psychiatric: He has a normal mood and affect. His speech is normal. Cognition and memory are normal.             ..    Chemistry        Component Value Date/Time     02/10/2020 0750    K 4.7 02/10/2020 0750     02/10/2020 0750    CO2 30 (H) 02/10/2020 0750    BUN 15 02/10/2020 0750    CREATININE 1.1 02/10/2020 0750    GLU 90 02/10/2020 0750        Component Value Date/Time    CALCIUM 8.9 02/10/2020 0750    ALKPHOS 82 02/10/2020 0750    AST 43 (H) 02/10/2020 0750    ALT 45 (H) 02/10/2020 0750    BILITOT 0.9 02/10/2020 0750    ESTGFRAFRICA >60.0 02/10/2020 0750    EGFRNONAA >60.0 02/10/2020 0750            ..  Lab Results   Component Value Date    CHOL 128 02/10/2020    CHOL 131 01/17/2020    CHOL 135 09/26/2019     Lab Results   Component Value Date    HDL 50 02/10/2020    HDL 50 01/17/2020    HDL 39 (L) 09/26/2019     Lab Results   Component Value Date    LDLCALC 69.4 02/10/2020    LDLCALC 71.2 01/17/2020    LDLCALC 81.0 09/26/2019     Lab Results   Component Value Date    TRIG 43 02/10/2020    TRIG 49 01/17/2020    TRIG 75 09/26/2019     Lab Results   Component Value Date    CHOLHDL 39.1 02/10/2020    CHOLHDL 38.2 01/17/2020    CHOLHDL 28.9 09/26/2019     ..  Lab Results   Component Value Date    WBC 6.49 02/10/2020    HGB 13.4 (L) 02/10/2020    HCT 43.6 02/10/2020    MCV 96 02/10/2020     02/10/2020       Test(s) Reviewed  I have reviewed the following in detail:  [] Stress test   [] Angiography   [x] Echocardiogram   [x] Labs   [x] Other:       Assessment:         ICD-10-CM ICD-9-CM   1. Cardiomyopathy, ischemic I25.5 414.8   2. Coronary artery disease involving native coronary artery of native heart without angina pectoris I25.10 414.01   3. S/P coronary artery stent placement - LAD 09/2015 Z95.5 V45.82    4. Dyslipidemia E78.5 272.4   5. History of acute anterior wall MI I25.2 412     Problem List Items Addressed This Visit     S/P coronary artery stent placement - LAD 09/2015    Overview     9/2015 STEMI- rebel BMS 3c20 post 3.5         History of acute anterior wall MI    Overview     2015         Dyslipidemia    Cardiomyopathy, ischemic - Primary    Overview     Resolved EF now 55%         CAD (coronary artery disease)    Overview     9/18 LAD- occluded (STEMI), CX, RCA- LI.                Plan:           Return to clinic 6 months   Low level/low impact aerobic exercise 5x's/wk. Heart healthy diet and risk factor modification.    See labs and med orders.  Dc lipitor try lower dose crestor follow LFTS.     Portions of this note may have been created with voice recognition software.  Grammatical, syntax and spelling errors may be inevitable.

## 2020-07-04 RX ORDER — EZETIMIBE 10 MG/1
TABLET ORAL
Qty: 90 TABLET | Refills: 4 | Status: SHIPPED | OUTPATIENT
Start: 2020-07-04 | End: 2021-07-29 | Stop reason: SDUPTHER

## 2020-08-12 ENCOUNTER — LAB VISIT (OUTPATIENT)
Dept: LAB | Facility: HOSPITAL | Age: 65
End: 2020-08-12
Attending: INTERNAL MEDICINE
Payer: MEDICARE

## 2020-08-12 DIAGNOSIS — Z79.899 ENCOUNTER FOR LONG-TERM (CURRENT) USE OF OTHER MEDICATIONS: ICD-10-CM

## 2020-08-12 DIAGNOSIS — I25.10 CORONARY ARTERY DISEASE INVOLVING NATIVE CORONARY ARTERY OF NATIVE HEART WITHOUT ANGINA PECTORIS: ICD-10-CM

## 2020-08-12 DIAGNOSIS — Z95.5 S/P CORONARY ARTERY STENT PLACEMENT: ICD-10-CM

## 2020-08-12 DIAGNOSIS — Z13.21 SCREENING FOR MALNUTRITION: ICD-10-CM

## 2020-08-12 DIAGNOSIS — Z13.0 SCREENING FOR IRON DEFICIENCY ANEMIA: ICD-10-CM

## 2020-08-12 DIAGNOSIS — E78.5 DYSLIPIDEMIA: ICD-10-CM

## 2020-08-12 DIAGNOSIS — I25.5 CARDIOMYOPATHY, ISCHEMIC: ICD-10-CM

## 2020-08-12 DIAGNOSIS — R53.83 FATIGUE: ICD-10-CM

## 2020-08-12 DIAGNOSIS — N40.0 BENIGN ENLARGEMENT OF PROSTATE: Primary | ICD-10-CM

## 2020-08-12 DIAGNOSIS — Z13.220 SCREENING FOR LIPOID DISORDERS: ICD-10-CM

## 2020-08-12 DIAGNOSIS — Z13.29 SCREENING FOR THYROID DISORDER: ICD-10-CM

## 2020-08-12 LAB
ALBUMIN SERPL BCP-MCNC: 3.9 G/DL (ref 3.5–5.2)
ALP SERPL-CCNC: 76 U/L (ref 55–135)
ALT SERPL W/O P-5'-P-CCNC: 54 U/L (ref 10–44)
ANION GAP SERPL CALC-SCNC: 6 MMOL/L (ref 8–16)
AST SERPL-CCNC: 44 U/L (ref 10–40)
BASOPHILS # BLD AUTO: 0.03 K/UL (ref 0–0.2)
BASOPHILS NFR BLD: 0.7 % (ref 0–1.9)
BILIRUB SERPL-MCNC: 1.1 MG/DL (ref 0.1–1)
BUN SERPL-MCNC: 22 MG/DL (ref 8–23)
CALCIUM SERPL-MCNC: 9.3 MG/DL (ref 8.7–10.5)
CHLORIDE SERPL-SCNC: 106 MMOL/L (ref 95–110)
CHOLEST SERPL-MCNC: 143 MG/DL (ref 120–199)
CHOLEST/HDLC SERPL: 2.3 {RATIO} (ref 2–5)
CK SERPL-CCNC: 188 U/L (ref 20–200)
CO2 SERPL-SCNC: 30 MMOL/L (ref 23–29)
COMPLEXED PSA SERPL-MCNC: 1.1 NG/ML (ref 0–4)
CREAT SERPL-MCNC: 1 MG/DL (ref 0.5–1.4)
DIFFERENTIAL METHOD: ABNORMAL
EOSINOPHIL # BLD AUTO: 0.2 K/UL (ref 0–0.5)
EOSINOPHIL NFR BLD: 3.7 % (ref 0–8)
ERYTHROCYTE [DISTWIDTH] IN BLOOD BY AUTOMATED COUNT: 13.3 % (ref 11.5–14.5)
EST. GFR  (AFRICAN AMERICAN): >60 ML/MIN/1.73 M^2
EST. GFR  (NON AFRICAN AMERICAN): >60 ML/MIN/1.73 M^2
ESTIMATED AVG GLUCOSE: 117 MG/DL (ref 68–131)
FERRITIN SERPL-MCNC: 82 NG/ML (ref 20–300)
FOLATE SERPL-MCNC: 18.3 NG/ML (ref 4–24)
GLUCOSE SERPL-MCNC: 88 MG/DL (ref 70–110)
HBA1C MFR BLD HPLC: 5.7 % (ref 4–5.6)
HCT VFR BLD AUTO: 44.5 % (ref 40–54)
HDLC SERPL-MCNC: 61 MG/DL (ref 40–75)
HDLC SERPL: 42.7 % (ref 20–50)
HGB BLD-MCNC: 13.9 G/DL (ref 14–18)
IMM GRANULOCYTES # BLD AUTO: 0.01 K/UL (ref 0–0.04)
IMM GRANULOCYTES NFR BLD AUTO: 0.2 % (ref 0–0.5)
LDLC SERPL CALC-MCNC: 70.2 MG/DL (ref 63–159)
LYMPHOCYTES # BLD AUTO: 1.8 K/UL (ref 1–4.8)
LYMPHOCYTES NFR BLD: 41 % (ref 18–48)
MCH RBC QN AUTO: 30.3 PG (ref 27–31)
MCHC RBC AUTO-ENTMCNC: 31.2 G/DL (ref 32–36)
MCV RBC AUTO: 97 FL (ref 82–98)
MONOCYTES # BLD AUTO: 0.5 K/UL (ref 0.3–1)
MONOCYTES NFR BLD: 11.2 % (ref 4–15)
NEUTROPHILS # BLD AUTO: 1.9 K/UL (ref 1.8–7.7)
NEUTROPHILS NFR BLD: 43.2 % (ref 38–73)
NONHDLC SERPL-MCNC: 82 MG/DL
NRBC BLD-RTO: 0 /100 WBC
PLATELET # BLD AUTO: 173 K/UL (ref 150–350)
PMV BLD AUTO: 11.1 FL (ref 9.2–12.9)
POTASSIUM SERPL-SCNC: 4.1 MMOL/L (ref 3.5–5.1)
PROT SERPL-MCNC: 6.6 G/DL (ref 6–8.4)
PTH-INTACT SERPL-MCNC: 64 PG/ML (ref 9–77)
RBC # BLD AUTO: 4.59 M/UL (ref 4.6–6.2)
SODIUM SERPL-SCNC: 142 MMOL/L (ref 136–145)
TRIGL SERPL-MCNC: 59 MG/DL (ref 30–150)
TSH SERPL DL<=0.005 MIU/L-ACNC: 0.44 UIU/ML (ref 0.4–4)
VIT B12 SERPL-MCNC: 1373 PG/ML (ref 210–950)
WBC # BLD AUTO: 4.37 K/UL (ref 3.9–12.7)

## 2020-08-12 PROCEDURE — 82550 ASSAY OF CK (CPK): CPT

## 2020-08-12 PROCEDURE — 82607 VITAMIN B-12: CPT

## 2020-08-12 PROCEDURE — 84443 ASSAY THYROID STIM HORMONE: CPT

## 2020-08-12 PROCEDURE — 82728 ASSAY OF FERRITIN: CPT | Mod: GA

## 2020-08-12 PROCEDURE — 83970 ASSAY OF PARATHORMONE: CPT

## 2020-08-12 PROCEDURE — 83036 HEMOGLOBIN GLYCOSYLATED A1C: CPT

## 2020-08-12 PROCEDURE — 80061 LIPID PANEL: CPT

## 2020-08-12 PROCEDURE — 84425 ASSAY OF VITAMIN B-1: CPT

## 2020-08-12 PROCEDURE — 84153 ASSAY OF PSA TOTAL: CPT

## 2020-08-12 PROCEDURE — 85025 COMPLETE CBC W/AUTO DIFF WBC: CPT

## 2020-08-12 PROCEDURE — 82746 ASSAY OF FOLIC ACID SERUM: CPT

## 2020-08-12 PROCEDURE — 80053 COMPREHEN METABOLIC PANEL: CPT

## 2020-08-12 PROCEDURE — 36415 COLL VENOUS BLD VENIPUNCTURE: CPT | Mod: PO

## 2020-08-17 LAB — VIT B1 BLD-MCNC: 58 UG/L (ref 38–122)

## 2020-09-17 ENCOUNTER — OFFICE VISIT (OUTPATIENT)
Dept: CARDIOLOGY | Facility: CLINIC | Age: 65
End: 2020-09-17
Payer: MEDICARE

## 2020-09-17 VITALS
HEART RATE: 55 BPM | HEIGHT: 70 IN | SYSTOLIC BLOOD PRESSURE: 115 MMHG | WEIGHT: 198.19 LBS | BODY MASS INDEX: 28.37 KG/M2 | DIASTOLIC BLOOD PRESSURE: 70 MMHG

## 2020-09-17 DIAGNOSIS — I25.10 CORONARY ARTERY DISEASE INVOLVING NATIVE CORONARY ARTERY OF NATIVE HEART WITHOUT ANGINA PECTORIS: ICD-10-CM

## 2020-09-17 DIAGNOSIS — I25.2 HISTORY OF ACUTE ANTERIOR WALL MI: Primary | ICD-10-CM

## 2020-09-17 DIAGNOSIS — E78.5 DYSLIPIDEMIA: ICD-10-CM

## 2020-09-17 DIAGNOSIS — I25.5 CARDIOMYOPATHY, ISCHEMIC: ICD-10-CM

## 2020-09-17 DIAGNOSIS — Z95.5 S/P CORONARY ARTERY STENT PLACEMENT: ICD-10-CM

## 2020-09-17 PROCEDURE — 3074F PR MOST RECENT SYSTOLIC BLOOD PRESSURE < 130 MM HG: ICD-10-PCS | Mod: CPTII,S$GLB,, | Performed by: INTERNAL MEDICINE

## 2020-09-17 PROCEDURE — 99214 OFFICE O/P EST MOD 30 MIN: CPT | Mod: S$GLB,,, | Performed by: INTERNAL MEDICINE

## 2020-09-17 PROCEDURE — 1101F PR PT FALLS ASSESS DOC 0-1 FALLS W/OUT INJ PAST YR: ICD-10-PCS | Mod: CPTII,S$GLB,, | Performed by: INTERNAL MEDICINE

## 2020-09-17 PROCEDURE — 99999 PR PBB SHADOW E&M-EST. PATIENT-LVL IV: ICD-10-PCS | Mod: PBBFAC,,, | Performed by: INTERNAL MEDICINE

## 2020-09-17 PROCEDURE — 99999 PR PBB SHADOW E&M-EST. PATIENT-LVL IV: CPT | Mod: PBBFAC,,, | Performed by: INTERNAL MEDICINE

## 2020-09-17 PROCEDURE — 3078F DIAST BP <80 MM HG: CPT | Mod: CPTII,S$GLB,, | Performed by: INTERNAL MEDICINE

## 2020-09-17 PROCEDURE — 3074F SYST BP LT 130 MM HG: CPT | Mod: CPTII,S$GLB,, | Performed by: INTERNAL MEDICINE

## 2020-09-17 PROCEDURE — 99214 PR OFFICE/OUTPT VISIT, EST, LEVL IV, 30-39 MIN: ICD-10-PCS | Mod: S$GLB,,, | Performed by: INTERNAL MEDICINE

## 2020-09-17 PROCEDURE — 3008F BODY MASS INDEX DOCD: CPT | Mod: CPTII,S$GLB,, | Performed by: INTERNAL MEDICINE

## 2020-09-17 PROCEDURE — 3008F PR BODY MASS INDEX (BMI) DOCUMENTED: ICD-10-PCS | Mod: CPTII,S$GLB,, | Performed by: INTERNAL MEDICINE

## 2020-09-17 PROCEDURE — 1101F PT FALLS ASSESS-DOCD LE1/YR: CPT | Mod: CPTII,S$GLB,, | Performed by: INTERNAL MEDICINE

## 2020-09-17 PROCEDURE — 3078F PR MOST RECENT DIASTOLIC BLOOD PRESSURE < 80 MM HG: ICD-10-PCS | Mod: CPTII,S$GLB,, | Performed by: INTERNAL MEDICINE

## 2020-09-17 RX ORDER — ROSUVASTATIN CALCIUM 5 MG/1
5 TABLET, COATED ORAL NIGHTLY
Qty: 90 TABLET | Refills: 4 | Status: SHIPPED | OUTPATIENT
Start: 2020-09-17 | End: 2021-09-30 | Stop reason: SDUPTHER

## 2020-09-17 NOTE — PROGRESS NOTES
Subjective:    Patient ID:  Tam Koo is a 64 y.o. male who presents for follow-up of Cardiomyopathy and Results      HPI  Here for follow up of CAD-PCI/STEMI 9/2015.  patient remains very active.  Currently is the mild listing house doing heavy physical activity without angina.    Review of Systems   Constitution: Negative for malaise/fatigue.   Eyes: Negative for blurred vision.   Cardiovascular: Negative for chest pain, claudication, cyanosis, dyspnea on exertion, irregular heartbeat, leg swelling, near-syncope, orthopnea, palpitations, paroxysmal nocturnal dyspnea and syncope.   Respiratory: Negative for cough and shortness of breath.    Hematologic/Lymphatic: Does not bruise/bleed easily.   Musculoskeletal: Negative for back pain, falls, joint pain, muscle cramps, muscle weakness and myalgias.   Gastrointestinal: Negative for abdominal pain, change in bowel habit, nausea and vomiting.   Genitourinary: Negative for urgency.   Neurological: Negative for dizziness, focal weakness and light-headedness.       Past Medical History:   Diagnosis Date    History of acute anterior wall MI 3/12/2020    2015    Kidney stone      There are no hospital problems to display for this patient.       Objective:     Vitals:    09/17/20 1047   BP: 115/70   Pulse: (!) 55        Physical Exam   Constitutional: He is oriented to person, place, and time. He appears well-developed and well-nourished.   Neck: Normal range of motion. Neck supple. No JVD present.   Cardiovascular: Normal rate, regular rhythm, normal heart sounds and intact distal pulses.   Pulses:       Carotid pulses are 2+ on the right side and 2+ on the left side.       Radial pulses are 2+ on the right side and 2+ on the left side.   Pulmonary/Chest: Effort normal and breath sounds normal.   Musculoskeletal: Normal range of motion.         General: No edema.   Neurological: He is alert and oriented to person, place, and time.   Skin: Skin is warm and dry.    Psychiatric: He has a normal mood and affect. His speech is normal. Cognition and memory are normal.             ..    Chemistry        Component Value Date/Time     08/12/2020 0836    K 4.1 08/12/2020 0836     08/12/2020 0836    CO2 30 (H) 08/12/2020 0836    BUN 22 08/12/2020 0836    CREATININE 1.0 08/12/2020 0836    GLU 88 08/12/2020 0836        Component Value Date/Time    CALCIUM 9.3 08/12/2020 0836    ALKPHOS 76 08/12/2020 0836    AST 44 (H) 08/12/2020 0836    ALT 54 (H) 08/12/2020 0836    BILITOT 1.1 (H) 08/12/2020 0836    ESTGFRAFRICA >60.0 08/12/2020 0836    EGFRNONAA >60.0 08/12/2020 0836            ..  Lab Results   Component Value Date    CHOL 143 08/12/2020    CHOL 128 02/10/2020    CHOL 131 01/17/2020     Lab Results   Component Value Date    HDL 61 08/12/2020    HDL 50 02/10/2020    HDL 50 01/17/2020     Lab Results   Component Value Date    LDLCALC 70.2 08/12/2020    LDLCALC 69.4 02/10/2020    LDLCALC 71.2 01/17/2020     Lab Results   Component Value Date    TRIG 59 08/12/2020    TRIG 43 02/10/2020    TRIG 49 01/17/2020     Lab Results   Component Value Date    CHOLHDL 42.7 08/12/2020    CHOLHDL 39.1 02/10/2020    CHOLHDL 38.2 01/17/2020     ..  Lab Results   Component Value Date    WBC 4.37 08/12/2020    HGB 13.9 (L) 08/12/2020    HCT 44.5 08/12/2020    MCV 97 08/12/2020     08/12/2020       Test(s) Reviewed  I have reviewed the following in detail:  [] Stress test   [] Angiography   [x] Echocardiogram   [x] Labs   [x] Other:       Assessment:         ICD-10-CM ICD-9-CM   1. History of acute anterior wall MI  I25.2 412   2. Cardiomyopathy, ischemic  I25.5 414.8   3. Coronary artery disease involving native coronary artery of native heart without angina pectoris  I25.10 414.01   4. S/P coronary artery stent placement - LAD 09/2015  Z95.5 V45.82   5. Dyslipidemia  E78.5 272.4     Problem List Items Addressed This Visit     CAD (coronary artery disease)    Overview     9/18 LAD-  occluded (STEMI), CX, RCA- LI.         Relevant Medications    rosuvastatin (CRESTOR) 5 MG tablet    Cardiomyopathy, ischemic    Overview     Resolved EF now 55%         Relevant Medications    rosuvastatin (CRESTOR) 5 MG tablet    Other Relevant Orders    Lipid Panel    Comprehensive metabolic panel    Dyslipidemia    Relevant Medications    rosuvastatin (CRESTOR) 5 MG tablet    Other Relevant Orders    Lipid Panel    Comprehensive metabolic panel    History of acute anterior wall MI - Primary    Overview     2015         S/P coronary artery stent placement - LAD 09/2015    Overview     9/2015 STEMI- rebel BMS 3c20 post 3.5         Relevant Medications    rosuvastatin (CRESTOR) 5 MG tablet    Other Relevant Orders    Lipid Panel    Comprehensive metabolic panel           Plan:           Return to clinic 6 months   Low level/low impact aerobic exercise 5x's/wk. Heart healthy diet and risk factor modification.    See labs and med orders.  Decrease Crestor to 5 mg. Repeat LFTs in 4 months.      Portions of this note may have been created with voice recognition software.  Grammatical, syntax and spelling errors may be inevitable.

## 2020-11-30 ENCOUNTER — TELEPHONE (OUTPATIENT)
Dept: CARDIOLOGY | Facility: CLINIC | Age: 65
End: 2020-11-30

## 2020-11-30 NOTE — TELEPHONE ENCOUNTER
"Pt states that for the past week he feels like his heart is "skipping beats" offered pt appt today, pt refused. Scheduled for next week.  "

## 2020-11-30 NOTE — TELEPHONE ENCOUNTER
----- Message from Abbi Juarez sent at 11/30/2020 10:03 AM CST -----  Regarding: Same Day Appt  Contact: patient  Type:  Same Day Appointment Request    Caller is requesting a same day appointment.  Caller declined first available appointment listed below.      Name of Caller:  patient  When is the first available appointment?  01/2021  Symptoms:  heart skipping beat since Tuesday  Best Call Back Number:  [phone]  Additional Information:   please call to advise

## 2020-12-07 ENCOUNTER — OFFICE VISIT (OUTPATIENT)
Dept: CARDIOLOGY | Facility: CLINIC | Age: 65
End: 2020-12-07
Payer: MEDICARE

## 2020-12-07 VITALS
WEIGHT: 198.88 LBS | BODY MASS INDEX: 28.47 KG/M2 | DIASTOLIC BLOOD PRESSURE: 68 MMHG | SYSTOLIC BLOOD PRESSURE: 136 MMHG | HEART RATE: 51 BPM | HEIGHT: 70 IN

## 2020-12-07 DIAGNOSIS — I25.2 HISTORY OF ACUTE ANTERIOR WALL MI: ICD-10-CM

## 2020-12-07 DIAGNOSIS — E78.5 DYSLIPIDEMIA: ICD-10-CM

## 2020-12-07 DIAGNOSIS — I25.10 CORONARY ARTERY DISEASE INVOLVING NATIVE CORONARY ARTERY OF NATIVE HEART WITHOUT ANGINA PECTORIS: Primary | ICD-10-CM

## 2020-12-07 DIAGNOSIS — Z95.5 S/P CORONARY ARTERY STENT PLACEMENT: ICD-10-CM

## 2020-12-07 PROCEDURE — 99214 PR OFFICE/OUTPT VISIT, EST, LEVL IV, 30-39 MIN: ICD-10-PCS | Mod: S$GLB,,, | Performed by: PHYSICIAN ASSISTANT

## 2020-12-07 PROCEDURE — 93000 ELECTROCARDIOGRAM COMPLETE: CPT | Mod: S$GLB,,, | Performed by: INTERNAL MEDICINE

## 2020-12-07 PROCEDURE — 1126F PR PAIN SEVERITY QUANTIFIED, NO PAIN PRESENT: ICD-10-PCS | Mod: S$GLB,,, | Performed by: PHYSICIAN ASSISTANT

## 2020-12-07 PROCEDURE — 3008F PR BODY MASS INDEX (BMI) DOCUMENTED: ICD-10-PCS | Mod: CPTII,S$GLB,, | Performed by: PHYSICIAN ASSISTANT

## 2020-12-07 PROCEDURE — 93000 EKG 12-LEAD: ICD-10-PCS | Mod: S$GLB,,, | Performed by: INTERNAL MEDICINE

## 2020-12-07 PROCEDURE — 3078F DIAST BP <80 MM HG: CPT | Mod: CPTII,S$GLB,, | Performed by: PHYSICIAN ASSISTANT

## 2020-12-07 PROCEDURE — 99214 OFFICE O/P EST MOD 30 MIN: CPT | Mod: S$GLB,,, | Performed by: PHYSICIAN ASSISTANT

## 2020-12-07 PROCEDURE — 3075F PR MOST RECENT SYSTOLIC BLOOD PRESS GE 130-139MM HG: ICD-10-PCS | Mod: CPTII,S$GLB,, | Performed by: PHYSICIAN ASSISTANT

## 2020-12-07 PROCEDURE — 99999 PR PBB SHADOW E&M-EST. PATIENT-LVL III: ICD-10-PCS | Mod: PBBFAC,,, | Performed by: PHYSICIAN ASSISTANT

## 2020-12-07 PROCEDURE — 1126F AMNT PAIN NOTED NONE PRSNT: CPT | Mod: S$GLB,,, | Performed by: PHYSICIAN ASSISTANT

## 2020-12-07 PROCEDURE — 3078F PR MOST RECENT DIASTOLIC BLOOD PRESSURE < 80 MM HG: ICD-10-PCS | Mod: CPTII,S$GLB,, | Performed by: PHYSICIAN ASSISTANT

## 2020-12-07 PROCEDURE — 3075F SYST BP GE 130 - 139MM HG: CPT | Mod: CPTII,S$GLB,, | Performed by: PHYSICIAN ASSISTANT

## 2020-12-07 PROCEDURE — 3008F BODY MASS INDEX DOCD: CPT | Mod: CPTII,S$GLB,, | Performed by: PHYSICIAN ASSISTANT

## 2020-12-07 PROCEDURE — 99999 PR PBB SHADOW E&M-EST. PATIENT-LVL III: CPT | Mod: PBBFAC,,, | Performed by: PHYSICIAN ASSISTANT

## 2020-12-07 NOTE — PROGRESS NOTES
"Subjective:    Patient ID:  Tam Koo is a 64 y.o. male who presents for follow-up of Chest Pain, Shortness of Breath, and Palpitations        HPI  Mr. Koo is a very pleasant gentleman who follows with Dr. Hernandez. He has a hx of STEMI s/p PCI in Sept 2015. He presents today for evaluation of palpitations. They initially occurred infrequently, but they have been increasing in frequency over the last month and now occur almost all day every day. He also c/o chest pressure (not exertional) and JOHNSON as well as malaise and fatigue. He became SOB walking up the stairs to clinic today which is very unlike him.     Review of Systems   Constitution: Negative for chills, diaphoresis, fever, weight gain and weight loss.   HENT: Negative for sore throat.    Eyes: Negative for blurred vision, vision loss in left eye, vision loss in right eye and visual disturbance.   Cardiovascular: Negative for chest pain, claudication, dyspnea on exertion, leg swelling, near-syncope, orthopnea, palpitations, paroxysmal nocturnal dyspnea and syncope.   Respiratory: Negative for cough, hemoptysis, shortness of breath, sputum production and wheezing.    Endocrine: Negative for cold intolerance and heat intolerance.   Hematologic/Lymphatic: Negative for adenopathy. Does not bruise/bleed easily.   Skin: Negative for rash.   Musculoskeletal: Negative for falls, muscle weakness and myalgias.   Gastrointestinal: Negative for abdominal pain, change in bowel habit, constipation, diarrhea, melena and nausea.   Genitourinary: Negative for bladder incontinence.   Neurological: Negative for dizziness, focal weakness, headaches, light-headedness, numbness and weakness.   Psychiatric/Behavioral: Negative for altered mental status.         Vitals:    12/07/20 1502   BP: 136/68   BP Location: Left arm   Patient Position: Sitting   BP Method: Small (Automatic)   Pulse: (!) 51   Weight: 90.2 kg (198 lb 13.7 oz)   Height: 5' 10" (1.778 m)       Objective:    " Physical Exam   Constitutional: He is oriented to person, place, and time. He appears well-developed and well-nourished.   HENT:   Head: Normocephalic and atraumatic.   Eyes: Pupils are equal, round, and reactive to light. EOM are normal.   Neck: Neck supple. No JVD present. No tracheal deviation present. No thyromegaly present.   Cardiovascular: Normal rate, regular rhythm, S1 normal, S2 normal, intact distal pulses and normal pulses. PMI is not displaced. Exam reveals no gallop and no friction rub.   No murmur heard.  Pulmonary/Chest: Effort normal and breath sounds normal. No respiratory distress. He has no wheezes. He has no rales. He exhibits no tenderness.   Abdominal: Soft. Bowel sounds are normal. He exhibits no distension and no mass. There is no abdominal tenderness.   Musculoskeletal: Normal range of motion.         General: No tenderness or edema.   Neurological: He is alert and oriented to person, place, and time.   Skin: Skin is warm and dry. No rash noted.   Psychiatric: He has a normal mood and affect. His behavior is normal.     EKG: SB, Rate 51      Assessment:       Problem List Items Addressed This Visit        Cardiology Problems    CAD (coronary artery disease) - Primary    Relevant Orders    IN OFFICE EKG 12-LEAD (to Muse)    Holter monitor - 48 hour    Nuclear Stress - Cardiology Interpreted       Other    Dyslipidemia    History of acute anterior wall MI    S/P coronary artery stent placement - LAD 09/2015           Plan:       48 Hour Holter to assess for arrhythmias.   Nuclear stress test to evaluate for ischemia.   Will likely need sleep study in the future.

## 2020-12-09 ENCOUNTER — CLINICAL SUPPORT (OUTPATIENT)
Dept: CARDIOLOGY | Facility: CLINIC | Age: 65
End: 2020-12-09
Attending: PHYSICIAN ASSISTANT
Payer: MEDICARE

## 2020-12-09 DIAGNOSIS — I25.10 CORONARY ARTERY DISEASE INVOLVING NATIVE CORONARY ARTERY OF NATIVE HEART WITHOUT ANGINA PECTORIS: ICD-10-CM

## 2020-12-09 PROCEDURE — 93224 HOLTER MONITOR - 48 HOUR (CUPID ONLY): ICD-10-PCS | Mod: S$GLB,,, | Performed by: INTERNAL MEDICINE

## 2020-12-09 PROCEDURE — 93224 XTRNL ECG REC UP TO 48 HRS: CPT | Mod: S$GLB,,, | Performed by: INTERNAL MEDICINE

## 2020-12-11 ENCOUNTER — CLINICAL SUPPORT (OUTPATIENT)
Dept: CARDIOLOGY | Facility: CLINIC | Age: 65
End: 2020-12-11
Attending: PHYSICIAN ASSISTANT
Payer: MEDICARE

## 2020-12-11 ENCOUNTER — HOSPITAL ENCOUNTER (OUTPATIENT)
Dept: RADIOLOGY | Facility: HOSPITAL | Age: 65
Discharge: HOME | End: 2020-12-11
Attending: PHYSICIAN ASSISTANT
Payer: MEDICARE

## 2020-12-11 VITALS — WEIGHT: 198 LBS | BODY MASS INDEX: 28.35 KG/M2 | HEIGHT: 70 IN

## 2020-12-11 DIAGNOSIS — I25.10 CORONARY ARTERY DISEASE INVOLVING NATIVE CORONARY ARTERY OF NATIVE HEART WITHOUT ANGINA PECTORIS: ICD-10-CM

## 2020-12-11 LAB
CV STRESS BASE HR: 48 BPM
DIASTOLIC BLOOD PRESSURE: 70 MMHG
NUC REST EJECTION FRACTION: 49
OHS CV CPX 1 MINUTE RECOVERY HEART RATE: 87 BPM
OHS CV CPX 85 PERCENT MAX PREDICTED HEART RATE MALE: 132
OHS CV CPX MAX PREDICTED HEART RATE: 155
OHS CV CPX PATIENT IS FEMALE: 0
OHS CV CPX PATIENT IS MALE: 1
OHS CV CPX PEAK DIASTOLIC BLOOD PRESSURE: 63 MMHG
OHS CV CPX PEAK HEAR RATE: 96 BPM
OHS CV CPX PEAK RATE PRESSURE PRODUCT: NORMAL
OHS CV CPX PEAK SYSTOLIC BLOOD PRESSURE: 124 MMHG
OHS CV CPX PERCENT MAX PREDICTED HEART RATE ACHIEVED: 62
OHS CV CPX RATE PRESSURE PRODUCT PRESENTING: 5520
SYSTOLIC BLOOD PRESSURE: 115 MMHG

## 2020-12-11 PROCEDURE — 78452 HT MUSCLE IMAGE SPECT MULT: CPT | Mod: 26,,, | Performed by: INTERNAL MEDICINE

## 2020-12-11 PROCEDURE — 78452 STRESS TEST WITH MYOCARDIAL PERFUSION (CUPID ONLY): ICD-10-PCS | Mod: 26,,, | Performed by: INTERNAL MEDICINE

## 2020-12-11 PROCEDURE — 93016 STRESS TEST WITH MYOCARDIAL PERFUSION (CUPID ONLY): ICD-10-PCS | Mod: ,,, | Performed by: INTERNAL MEDICINE

## 2020-12-11 PROCEDURE — 99999 PR PBB SHADOW E&M-EST. PATIENT-LVL I: CPT | Mod: PBBFAC,,,

## 2020-12-11 PROCEDURE — 93017 CV STRESS TEST TRACING ONLY: CPT

## 2020-12-11 PROCEDURE — 93018 CV STRESS TEST I&R ONLY: CPT | Mod: ,,, | Performed by: INTERNAL MEDICINE

## 2020-12-11 PROCEDURE — 93018 PR CARDIAC STRESS TST,INTERP/REPT ONLY: ICD-10-PCS | Mod: ,,, | Performed by: INTERNAL MEDICINE

## 2020-12-11 PROCEDURE — 99999 PR PBB SHADOW E&M-EST. PATIENT-LVL I: ICD-10-PCS | Mod: PBBFAC,,,

## 2020-12-11 PROCEDURE — 93016 CV STRESS TEST SUPVJ ONLY: CPT | Mod: ,,, | Performed by: INTERNAL MEDICINE

## 2020-12-14 ENCOUNTER — TELEPHONE (OUTPATIENT)
Dept: CARDIOLOGY | Facility: CLINIC | Age: 65
End: 2020-12-14

## 2020-12-14 NOTE — TELEPHONE ENCOUNTER
----- Message from Allyn Richard sent at 12/14/2020  2:18 PM CST -----  Regarding: Returning call  Contact: Patient  Type:  Patient Returning Call    Who Called:  Patient  Who Left Message for Patient:  Not sure clinic  Does the patient know what this is regarding?:  probably stress test results  Best Call Back Number:  [phone] or [phone]  Additional Information:

## 2020-12-15 LAB
OHS CV EVENT MONITOR DAY: 0
OHS CV HOLTER LENGTH DECIMAL HOURS: 48
OHS CV HOLTER LENGTH HOURS: 48
OHS CV HOLTER LENGTH MINUTES: 0

## 2021-03-19 ENCOUNTER — LAB VISIT (OUTPATIENT)
Dept: LAB | Facility: HOSPITAL | Age: 66
End: 2021-03-19
Attending: INTERNAL MEDICINE
Payer: MEDICARE

## 2021-03-19 DIAGNOSIS — R33.8 HYPERPLASIA OF PROSTATE WITH URINARY RETENTION: ICD-10-CM

## 2021-03-19 DIAGNOSIS — N40.1 ENLARGED PROSTATE WITH URINARY OBSTRUCTION: Primary | ICD-10-CM

## 2021-03-19 DIAGNOSIS — I25.5 CARDIOMYOPATHY, ISCHEMIC: ICD-10-CM

## 2021-03-19 DIAGNOSIS — E78.5 DYSLIPIDEMIA: ICD-10-CM

## 2021-03-19 DIAGNOSIS — N13.8 ENLARGED PROSTATE WITH URINARY OBSTRUCTION: Primary | ICD-10-CM

## 2021-03-19 DIAGNOSIS — Z95.5 S/P CORONARY ARTERY STENT PLACEMENT: ICD-10-CM

## 2021-03-19 DIAGNOSIS — N40.1 HYPERPLASIA OF PROSTATE WITH URINARY RETENTION: ICD-10-CM

## 2021-03-19 LAB
ALBUMIN SERPL BCP-MCNC: 3.7 G/DL (ref 3.5–5.2)
ALP SERPL-CCNC: 56 U/L (ref 55–135)
ALT SERPL W/O P-5'-P-CCNC: 34 U/L (ref 10–44)
ANION GAP SERPL CALC-SCNC: 8 MMOL/L (ref 8–16)
AST SERPL-CCNC: 34 U/L (ref 10–40)
BILIRUB SERPL-MCNC: 0.9 MG/DL (ref 0.1–1)
BUN SERPL-MCNC: 25 MG/DL (ref 8–23)
CALCIUM SERPL-MCNC: 9.4 MG/DL (ref 8.7–10.5)
CHLORIDE SERPL-SCNC: 105 MMOL/L (ref 95–110)
CHOLEST SERPL-MCNC: 150 MG/DL (ref 120–199)
CHOLEST/HDLC SERPL: 2.8 {RATIO} (ref 2–5)
CO2 SERPL-SCNC: 28 MMOL/L (ref 23–29)
COMPLEXED PSA SERPL-MCNC: 1.1 NG/ML (ref 0–4)
CREAT SERPL-MCNC: 1.1 MG/DL (ref 0.5–1.4)
EST. GFR  (AFRICAN AMERICAN): >60 ML/MIN/1.73 M^2
EST. GFR  (NON AFRICAN AMERICAN): >60 ML/MIN/1.73 M^2
GLUCOSE SERPL-MCNC: 94 MG/DL (ref 70–110)
HDLC SERPL-MCNC: 54 MG/DL (ref 40–75)
HDLC SERPL: 36 % (ref 20–50)
LDLC SERPL CALC-MCNC: 84.2 MG/DL (ref 63–159)
NONHDLC SERPL-MCNC: 96 MG/DL
POTASSIUM SERPL-SCNC: 4.9 MMOL/L (ref 3.5–5.1)
PROT SERPL-MCNC: 6.6 G/DL (ref 6–8.4)
SODIUM SERPL-SCNC: 141 MMOL/L (ref 136–145)
TRIGL SERPL-MCNC: 59 MG/DL (ref 30–150)

## 2021-03-19 PROCEDURE — 36415 COLL VENOUS BLD VENIPUNCTURE: CPT | Mod: PO | Performed by: INTERNAL MEDICINE

## 2021-03-19 PROCEDURE — 36415 COLL VENOUS BLD VENIPUNCTURE: CPT | Mod: PO | Performed by: UROLOGY

## 2021-03-19 PROCEDURE — 80061 LIPID PANEL: CPT | Performed by: INTERNAL MEDICINE

## 2021-03-19 PROCEDURE — 80053 COMPREHEN METABOLIC PANEL: CPT | Performed by: INTERNAL MEDICINE

## 2021-03-19 PROCEDURE — 84153 ASSAY OF PSA TOTAL: CPT | Performed by: UROLOGY

## 2021-03-23 ENCOUNTER — OFFICE VISIT (OUTPATIENT)
Dept: CARDIOLOGY | Facility: CLINIC | Age: 66
End: 2021-03-23
Payer: MEDICARE

## 2021-03-23 VITALS
WEIGHT: 205.94 LBS | SYSTOLIC BLOOD PRESSURE: 135 MMHG | BODY MASS INDEX: 29.48 KG/M2 | HEART RATE: 61 BPM | HEIGHT: 70 IN | DIASTOLIC BLOOD PRESSURE: 78 MMHG

## 2021-03-23 DIAGNOSIS — Z95.5 S/P CORONARY ARTERY STENT PLACEMENT: ICD-10-CM

## 2021-03-23 DIAGNOSIS — I25.5 CARDIOMYOPATHY, ISCHEMIC: Primary | ICD-10-CM

## 2021-03-23 DIAGNOSIS — I49.3 FREQUENT PVCS: ICD-10-CM

## 2021-03-23 DIAGNOSIS — I10 ESSENTIAL (PRIMARY) HYPERTENSION: ICD-10-CM

## 2021-03-23 DIAGNOSIS — I25.10 CORONARY ARTERY DISEASE INVOLVING NATIVE CORONARY ARTERY OF NATIVE HEART WITHOUT ANGINA PECTORIS: ICD-10-CM

## 2021-03-23 DIAGNOSIS — R55 SYNCOPE, UNSPECIFIED SYNCOPE TYPE: ICD-10-CM

## 2021-03-23 DIAGNOSIS — I49.3 PVC (PREMATURE VENTRICULAR CONTRACTION): ICD-10-CM

## 2021-03-23 DIAGNOSIS — I25.2 HISTORY OF ACUTE ANTERIOR WALL MI: ICD-10-CM

## 2021-03-23 DIAGNOSIS — E78.5 DYSLIPIDEMIA: ICD-10-CM

## 2021-03-23 DIAGNOSIS — R60.1 GENERALIZED EDEMA: ICD-10-CM

## 2021-03-23 PROCEDURE — 99214 PR OFFICE/OUTPT VISIT, EST, LEVL IV, 30-39 MIN: ICD-10-PCS | Mod: S$GLB,,, | Performed by: INTERNAL MEDICINE

## 2021-03-23 PROCEDURE — 3075F PR MOST RECENT SYSTOLIC BLOOD PRESS GE 130-139MM HG: ICD-10-PCS | Mod: CPTII,S$GLB,, | Performed by: INTERNAL MEDICINE

## 2021-03-23 PROCEDURE — 3078F DIAST BP <80 MM HG: CPT | Mod: CPTII,S$GLB,, | Performed by: INTERNAL MEDICINE

## 2021-03-23 PROCEDURE — 3008F PR BODY MASS INDEX (BMI) DOCUMENTED: ICD-10-PCS | Mod: CPTII,S$GLB,, | Performed by: INTERNAL MEDICINE

## 2021-03-23 PROCEDURE — 3008F BODY MASS INDEX DOCD: CPT | Mod: CPTII,S$GLB,, | Performed by: INTERNAL MEDICINE

## 2021-03-23 PROCEDURE — 1126F PR PAIN SEVERITY QUANTIFIED, NO PAIN PRESENT: ICD-10-PCS | Mod: S$GLB,,, | Performed by: INTERNAL MEDICINE

## 2021-03-23 PROCEDURE — 1101F PR PT FALLS ASSESS DOC 0-1 FALLS W/OUT INJ PAST YR: ICD-10-PCS | Mod: CPTII,S$GLB,, | Performed by: INTERNAL MEDICINE

## 2021-03-23 PROCEDURE — 3288F FALL RISK ASSESSMENT DOCD: CPT | Mod: CPTII,S$GLB,, | Performed by: INTERNAL MEDICINE

## 2021-03-23 PROCEDURE — 3075F SYST BP GE 130 - 139MM HG: CPT | Mod: CPTII,S$GLB,, | Performed by: INTERNAL MEDICINE

## 2021-03-23 PROCEDURE — 3078F PR MOST RECENT DIASTOLIC BLOOD PRESSURE < 80 MM HG: ICD-10-PCS | Mod: CPTII,S$GLB,, | Performed by: INTERNAL MEDICINE

## 2021-03-23 PROCEDURE — 1159F MED LIST DOCD IN RCRD: CPT | Mod: S$GLB,,, | Performed by: INTERNAL MEDICINE

## 2021-03-23 PROCEDURE — 3288F PR FALLS RISK ASSESSMENT DOCUMENTED: ICD-10-PCS | Mod: CPTII,S$GLB,, | Performed by: INTERNAL MEDICINE

## 2021-03-23 PROCEDURE — 99214 OFFICE O/P EST MOD 30 MIN: CPT | Mod: S$GLB,,, | Performed by: INTERNAL MEDICINE

## 2021-03-23 PROCEDURE — 99999 PR PBB SHADOW E&M-EST. PATIENT-LVL III: ICD-10-PCS | Mod: PBBFAC,,, | Performed by: INTERNAL MEDICINE

## 2021-03-23 PROCEDURE — 1159F PR MEDICATION LIST DOCUMENTED IN MEDICAL RECORD: ICD-10-PCS | Mod: S$GLB,,, | Performed by: INTERNAL MEDICINE

## 2021-03-23 PROCEDURE — 1126F AMNT PAIN NOTED NONE PRSNT: CPT | Mod: S$GLB,,, | Performed by: INTERNAL MEDICINE

## 2021-03-23 PROCEDURE — 99999 PR PBB SHADOW E&M-EST. PATIENT-LVL III: CPT | Mod: PBBFAC,,, | Performed by: INTERNAL MEDICINE

## 2021-03-23 PROCEDURE — 1101F PT FALLS ASSESS-DOCD LE1/YR: CPT | Mod: CPTII,S$GLB,, | Performed by: INTERNAL MEDICINE

## 2021-04-23 ENCOUNTER — CLINICAL SUPPORT (OUTPATIENT)
Dept: CARDIOLOGY | Facility: CLINIC | Age: 66
End: 2021-04-23
Attending: INTERNAL MEDICINE
Payer: MEDICARE

## 2021-04-23 ENCOUNTER — LAB VISIT (OUTPATIENT)
Dept: LAB | Facility: HOSPITAL | Age: 66
End: 2021-04-23
Attending: INTERNAL MEDICINE
Payer: MEDICARE

## 2021-04-23 DIAGNOSIS — I49.3 FREQUENT PVCS: ICD-10-CM

## 2021-04-23 DIAGNOSIS — I10 ESSENTIAL (PRIMARY) HYPERTENSION: ICD-10-CM

## 2021-04-23 DIAGNOSIS — Z95.5 S/P CORONARY ARTERY STENT PLACEMENT: ICD-10-CM

## 2021-04-23 DIAGNOSIS — I25.5 CARDIOMYOPATHY, ISCHEMIC: ICD-10-CM

## 2021-04-23 DIAGNOSIS — R60.1 GENERALIZED EDEMA: ICD-10-CM

## 2021-04-23 DIAGNOSIS — I25.10 CORONARY ARTERY DISEASE INVOLVING NATIVE CORONARY ARTERY OF NATIVE HEART WITHOUT ANGINA PECTORIS: ICD-10-CM

## 2021-04-23 LAB
ASCENDING AORTA: 2.89 CM
AV INDEX (PROSTH): 0.72
AV MEAN GRADIENT: 4 MMHG
AV PEAK GRADIENT: 8 MMHG
AV VALVE AREA: 2.93 CM2
AV VELOCITY RATIO: 0.72
CV ECHO LV RWT: 0.35 CM
DOP CALC AO PEAK VEL: 1.42 M/S
DOP CALC AO VTI: 32.23 CM
DOP CALC LVOT AREA: 4.1 CM2
DOP CALC LVOT DIAMETER: 2.28 CM
DOP CALC LVOT PEAK VEL: 1.02 M/S
DOP CALC LVOT STROKE VOLUME: 94.43 CM3
DOP CALCLVOT PEAK VEL VTI: 23.14 CM
E WAVE DECELERATION TIME: 274.04 MSEC
E/A RATIO: 0.93
E/E' RATIO: 11.43 M/S
ECHO LV POSTERIOR WALL: 0.92 CM (ref 0.6–1.1)
EJECTION FRACTION: 55 %
FRACTIONAL SHORTENING: 30 % (ref 28–44)
INTERVENTRICULAR SEPTUM: 0.93 CM (ref 0.6–1.1)
LA MAJOR: 4.98 CM
LA MINOR: 4.91 CM
LA WIDTH: 3.82 CM
LEFT ATRIUM SIZE: 3.81 CM
LEFT ATRIUM VOLUME: 61.17 CM3
LEFT INTERNAL DIMENSION IN SYSTOLE: 3.74 CM (ref 2.1–4)
LEFT VENTRICLE DIASTOLIC VOLUME: 136.9 ML
LEFT VENTRICLE SYSTOLIC VOLUME: 59.66 ML
LEFT VENTRICULAR INTERNAL DIMENSION IN DIASTOLE: 5.33 CM (ref 3.5–6)
LEFT VENTRICULAR MASS: 182.58 G
LV LATERAL E/E' RATIO: 11.43 M/S
LV SEPTAL E/E' RATIO: 11.43 M/S
MV A" WAVE DURATION": 15.6 MSEC
MV PEAK A VEL: 0.86 M/S
MV PEAK E VEL: 0.8 M/S
PISA TR MAX VEL: 2.53 M/S
PULM VEIN S/D RATIO: 1.25
PV PEAK D VEL: 0.4 M/S
PV PEAK S VEL: 0.5 M/S
RA MAJOR: 4.32 CM
RA PRESSURE: 3 MMHG
RA WIDTH: 4.57 CM
RIGHT VENTRICULAR END-DIASTOLIC DIMENSION: 3.8 CM
RV TISSUE DOPPLER FREE WALL SYSTOLIC VELOCITY 1 (APICAL 4 CHAMBER VIEW): 14.51 CM/S
SINUS: 2.86 CM
STJ: 2.79 CM
T4 FREE SERPL-MCNC: 0.89 NG/DL (ref 0.71–1.51)
TDI LATERAL: 0.07 M/S
TDI SEPTAL: 0.07 M/S
TDI: 0.07 M/S
TR MAX PG: 26 MMHG
TRICUSPID ANNULAR PLANE SYSTOLIC EXCURSION: 3.25 CM
TSH SERPL DL<=0.005 MIU/L-ACNC: 0.54 UIU/ML (ref 0.4–4)
TV REST PULMONARY ARTERY PRESSURE: 29 MMHG

## 2021-04-23 PROCEDURE — 84443 ASSAY THYROID STIM HORMONE: CPT | Performed by: INTERNAL MEDICINE

## 2021-04-23 PROCEDURE — 36415 COLL VENOUS BLD VENIPUNCTURE: CPT | Mod: PO | Performed by: INTERNAL MEDICINE

## 2021-04-23 PROCEDURE — 84439 ASSAY OF FREE THYROXINE: CPT | Performed by: INTERNAL MEDICINE

## 2021-07-02 ENCOUNTER — HOSPITAL ENCOUNTER (OUTPATIENT)
Dept: CARDIOLOGY | Facility: HOSPITAL | Age: 66
Discharge: HOME | End: 2021-07-02
Attending: INTERNAL MEDICINE
Payer: MEDICARE

## 2021-07-02 DIAGNOSIS — I25.5 CARDIOMYOPATHY, ISCHEMIC: ICD-10-CM

## 2021-07-02 DIAGNOSIS — I25.10 CORONARY ARTERY DISEASE INVOLVING NATIVE CORONARY ARTERY OF NATIVE HEART WITHOUT ANGINA PECTORIS: ICD-10-CM

## 2021-07-02 DIAGNOSIS — Z95.5 S/P CORONARY ARTERY STENT PLACEMENT: ICD-10-CM

## 2021-07-02 PROCEDURE — 93227 XTRNL ECG REC<48 HR R&I: CPT | Mod: ,,, | Performed by: INTERNAL MEDICINE

## 2021-07-02 PROCEDURE — 93225 XTRNL ECG REC<48 HRS REC: CPT | Mod: PO

## 2021-07-02 PROCEDURE — 93227 HOLTER MONITOR - 24 HOUR (CUPID ONLY): ICD-10-PCS | Mod: ,,, | Performed by: INTERNAL MEDICINE

## 2021-07-09 LAB
OHS CV EVENT MONITOR DAY: 0
OHS CV HOLTER LENGTH DECIMAL HOURS: 24
OHS CV HOLTER LENGTH HOURS: 24
OHS CV HOLTER LENGTH MINUTES: 0

## 2021-07-29 RX ORDER — EZETIMIBE 10 MG/1
10 TABLET ORAL DAILY
Qty: 90 TABLET | Refills: 4 | Status: SHIPPED | OUTPATIENT
Start: 2021-07-29 | End: 2022-08-01

## 2021-08-06 ENCOUNTER — OFFICE VISIT (OUTPATIENT)
Dept: CARDIOLOGY | Facility: CLINIC | Age: 66
End: 2021-08-06
Payer: MEDICARE

## 2021-08-06 VITALS
WEIGHT: 223.56 LBS | SYSTOLIC BLOOD PRESSURE: 122 MMHG | HEIGHT: 70 IN | BODY MASS INDEX: 32.01 KG/M2 | HEART RATE: 58 BPM | DIASTOLIC BLOOD PRESSURE: 66 MMHG

## 2021-08-06 DIAGNOSIS — I25.5 CARDIOMYOPATHY, ISCHEMIC: ICD-10-CM

## 2021-08-06 DIAGNOSIS — I25.2 HISTORY OF ACUTE ANTERIOR WALL MI: ICD-10-CM

## 2021-08-06 DIAGNOSIS — Z95.5 S/P CORONARY ARTERY STENT PLACEMENT: Primary | ICD-10-CM

## 2021-08-06 DIAGNOSIS — E78.5 DYSLIPIDEMIA: ICD-10-CM

## 2021-08-06 DIAGNOSIS — I25.10 CORONARY ARTERY DISEASE INVOLVING NATIVE CORONARY ARTERY OF NATIVE HEART WITHOUT ANGINA PECTORIS: ICD-10-CM

## 2021-08-06 DIAGNOSIS — Z95.5 S/P CORONARY ARTERY STENT PLACEMENT: ICD-10-CM

## 2021-08-06 DIAGNOSIS — I49.3 PVC (PREMATURE VENTRICULAR CONTRACTION): ICD-10-CM

## 2021-08-06 PROCEDURE — 1160F RVW MEDS BY RX/DR IN RCRD: CPT | Mod: CPTII,S$GLB,, | Performed by: INTERNAL MEDICINE

## 2021-08-06 PROCEDURE — 99999 PR PBB SHADOW E&M-EST. PATIENT-LVL III: ICD-10-PCS | Mod: PBBFAC,,, | Performed by: INTERNAL MEDICINE

## 2021-08-06 PROCEDURE — 1126F PR PAIN SEVERITY QUANTIFIED, NO PAIN PRESENT: ICD-10-PCS | Mod: CPTII,S$GLB,, | Performed by: INTERNAL MEDICINE

## 2021-08-06 PROCEDURE — 3078F PR MOST RECENT DIASTOLIC BLOOD PRESSURE < 80 MM HG: ICD-10-PCS | Mod: CPTII,S$GLB,, | Performed by: INTERNAL MEDICINE

## 2021-08-06 PROCEDURE — 3008F PR BODY MASS INDEX (BMI) DOCUMENTED: ICD-10-PCS | Mod: CPTII,S$GLB,, | Performed by: INTERNAL MEDICINE

## 2021-08-06 PROCEDURE — 99214 PR OFFICE/OUTPT VISIT, EST, LEVL IV, 30-39 MIN: ICD-10-PCS | Mod: S$GLB,,, | Performed by: INTERNAL MEDICINE

## 2021-08-06 PROCEDURE — 99214 OFFICE O/P EST MOD 30 MIN: CPT | Mod: S$GLB,,, | Performed by: INTERNAL MEDICINE

## 2021-08-06 PROCEDURE — 99999 PR PBB SHADOW E&M-EST. PATIENT-LVL III: CPT | Mod: PBBFAC,,, | Performed by: INTERNAL MEDICINE

## 2021-08-06 PROCEDURE — 1126F AMNT PAIN NOTED NONE PRSNT: CPT | Mod: CPTII,S$GLB,, | Performed by: INTERNAL MEDICINE

## 2021-08-06 PROCEDURE — 3074F SYST BP LT 130 MM HG: CPT | Mod: CPTII,S$GLB,, | Performed by: INTERNAL MEDICINE

## 2021-08-06 PROCEDURE — 3074F PR MOST RECENT SYSTOLIC BLOOD PRESSURE < 130 MM HG: ICD-10-PCS | Mod: CPTII,S$GLB,, | Performed by: INTERNAL MEDICINE

## 2021-08-06 PROCEDURE — 1160F PR REVIEW ALL MEDS BY PRESCRIBER/CLIN PHARMACIST DOCUMENTED: ICD-10-PCS | Mod: CPTII,S$GLB,, | Performed by: INTERNAL MEDICINE

## 2021-08-06 PROCEDURE — 3288F PR FALLS RISK ASSESSMENT DOCUMENTED: ICD-10-PCS | Mod: CPTII,S$GLB,, | Performed by: INTERNAL MEDICINE

## 2021-08-06 PROCEDURE — 1101F PR PT FALLS ASSESS DOC 0-1 FALLS W/OUT INJ PAST YR: ICD-10-PCS | Mod: CPTII,S$GLB,, | Performed by: INTERNAL MEDICINE

## 2021-08-06 PROCEDURE — 3078F DIAST BP <80 MM HG: CPT | Mod: CPTII,S$GLB,, | Performed by: INTERNAL MEDICINE

## 2021-08-06 PROCEDURE — 1101F PT FALLS ASSESS-DOCD LE1/YR: CPT | Mod: CPTII,S$GLB,, | Performed by: INTERNAL MEDICINE

## 2021-08-06 PROCEDURE — 3288F FALL RISK ASSESSMENT DOCD: CPT | Mod: CPTII,S$GLB,, | Performed by: INTERNAL MEDICINE

## 2021-08-06 PROCEDURE — 1159F PR MEDICATION LIST DOCUMENTED IN MEDICAL RECORD: ICD-10-PCS | Mod: CPTII,S$GLB,, | Performed by: INTERNAL MEDICINE

## 2021-08-06 PROCEDURE — 3008F BODY MASS INDEX DOCD: CPT | Mod: CPTII,S$GLB,, | Performed by: INTERNAL MEDICINE

## 2021-08-06 PROCEDURE — 1159F MED LIST DOCD IN RCRD: CPT | Mod: CPTII,S$GLB,, | Performed by: INTERNAL MEDICINE

## 2021-09-24 RX ORDER — METOPROLOL SUCCINATE 25 MG/1
25 TABLET, EXTENDED RELEASE ORAL NIGHTLY
Qty: 90 TABLET | Refills: 3 | OUTPATIENT
Start: 2021-09-24 | End: 2022-09-24

## 2021-09-30 DIAGNOSIS — I25.10 CORONARY ARTERY DISEASE INVOLVING NATIVE CORONARY ARTERY OF NATIVE HEART WITHOUT ANGINA PECTORIS: ICD-10-CM

## 2021-09-30 DIAGNOSIS — E78.5 DYSLIPIDEMIA: ICD-10-CM

## 2021-09-30 DIAGNOSIS — Z95.5 S/P CORONARY ARTERY STENT PLACEMENT: ICD-10-CM

## 2021-09-30 DIAGNOSIS — I25.5 CARDIOMYOPATHY, ISCHEMIC: ICD-10-CM

## 2021-09-30 RX ORDER — ROSUVASTATIN CALCIUM 5 MG/1
5 TABLET, COATED ORAL NIGHTLY
Qty: 90 TABLET | Refills: 3 | Status: SHIPPED | OUTPATIENT
Start: 2021-09-30 | End: 2022-08-01

## 2021-09-30 RX ORDER — METOPROLOL SUCCINATE 25 MG/1
25 TABLET, EXTENDED RELEASE ORAL NIGHTLY
Qty: 90 TABLET | Refills: 3 | Status: SHIPPED | OUTPATIENT
Start: 2021-09-30 | End: 2022-07-12

## 2021-12-10 NOTE — TELEPHONE ENCOUNTER
[facility]  Initial Interdisciplinary Treatment Plan NO      Original treatment plan Date & Time: 12/10/2021   0951    Admission Type:  Admission Type:  Involuntary (pink slip)    Reason for admission:   Reason for Admission: Acute psychosis and SI attempt    Estimated Length of Stay:  5-7days  Estimated Discharge Date: to be determined by physician    PATIENT STRENGTHS:  Patient Strengths:Strengths:  (DAYANA)  Patient Strengths and Limitations:Limitations:  (DAYANA)  Addictive Behavior: Addictive Behavior  In the past 3 months, have you felt or has someone told you that you have a problem with:  :  (DAYANA)  Do you have a history of Chemical Use?:  (DAYANA)  Do you have a history of Alcohol Use?:  (DAYANA)  Do you have a history of Street Drug Abuse?:  (DAYANA)  Histroy of Prescripton Drug Abuse?:  (DAYANA)  Medical Problems:  Past Medical History:   Diagnosis Date    ADHD (attention deficit hyperactivity disorder)     Anxiety     Depression      Status EXAM:Status and Exam  Normal: No  Facial Expression: Avoids Gaze  Affect: Unstable  Level of Consciousness: Alert  Mood:Normal: No  Mood: Helpless, Suspicious  Motor Activity:Normal:  (DAYANA)  Interview Behavior: Uncooperative/Withdrawn  Preception: [place] to Person, [place] to Time, [place] to Place, [place] to Situation  Attention:Normal:  (DAYANA)  Thought Processes: Blocking  Thought Content:Normal: No  Hallucinations: Unable to assess  Delusions:  (DAYANA)  Memory:Normal: No  Memory: Poor Recent, Poor Remote  Insight and Judgment: No  Insight and Judgment: Unmotivated, Poor Insight, Poor Judgment  Present Suicidal Ideation:  (DAYANA)  Present Homicidal Ideation:  (DAYANA)    EDUCATION:   Learner Progress Toward Treatment Goals: reviewed group plans and strategies for care    Method:group therapy, medication compliance, individualized assessments and care planning    Outcome: needs reinforcement    PATIENT GOALS: to be discussed with patient within 72 hours    PLAN/TREATMENT Spoke with pt let him know will send clearance to  since pt has been seen recently. Informed pt if any problems being cleared would let pt know.Sched next available appt w    RECOMMENDATIONS:     continue group therapy , medications compliance, goal setting, individualized assessments and care, continue to monitor pt on unit      SHORT-TERM GOALS:   Time frame for Short-Term Goals: 5-7 days    LONG-TERM GOALS:  Time frame for Long-Term Goals: 6 months  Members Present in Team Meeting: See Signature Sheet    Jocelyne Foreman

## 2022-05-05 ENCOUNTER — OFFICE VISIT (OUTPATIENT)
Dept: FAMILY MEDICINE | Facility: CLINIC | Age: 67
End: 2022-05-05
Payer: MEDICARE

## 2022-05-05 VITALS
RESPIRATION RATE: 20 BRPM | BODY MASS INDEX: 33.93 KG/M2 | HEART RATE: 63 BPM | HEIGHT: 70 IN | DIASTOLIC BLOOD PRESSURE: 76 MMHG | SYSTOLIC BLOOD PRESSURE: 114 MMHG | WEIGHT: 237 LBS

## 2022-05-05 DIAGNOSIS — I25.2 HISTORY OF ACUTE ANTERIOR WALL MI: ICD-10-CM

## 2022-05-05 DIAGNOSIS — E78.5 DYSLIPIDEMIA: ICD-10-CM

## 2022-05-05 DIAGNOSIS — E11.9 TYPE 2 DIABETES MELLITUS WITHOUT COMPLICATION, WITHOUT LONG-TERM CURRENT USE OF INSULIN: Primary | ICD-10-CM

## 2022-05-05 DIAGNOSIS — Z87.891 PERSONAL HISTORY OF NICOTINE DEPENDENCE: ICD-10-CM

## 2022-05-05 DIAGNOSIS — Z11.59 NEED FOR HEPATITIS C SCREENING TEST: ICD-10-CM

## 2022-05-05 DIAGNOSIS — Z95.5 S/P CORONARY ARTERY STENT PLACEMENT: ICD-10-CM

## 2022-05-05 DIAGNOSIS — Z79.899 ENCOUNTER FOR LONG-TERM CURRENT USE OF MEDICATION: ICD-10-CM

## 2022-05-05 DIAGNOSIS — E66.01 MORBID OBESITY: ICD-10-CM

## 2022-05-05 DIAGNOSIS — Z12.5 PROSTATE CANCER SCREENING: ICD-10-CM

## 2022-05-05 DIAGNOSIS — Z12.2 ENCOUNTER FOR SCREENING FOR LUNG CANCER: ICD-10-CM

## 2022-05-05 DIAGNOSIS — I25.5 CARDIOMYOPATHY, ISCHEMIC: ICD-10-CM

## 2022-05-05 LAB
LEFT EYE DM RETINOPATHY: NEGATIVE
RIGHT EYE DM RETINOPATHY: NEGATIVE

## 2022-05-05 PROCEDURE — 3078F PR MOST RECENT DIASTOLIC BLOOD PRESSURE < 80 MM HG: ICD-10-PCS | Mod: CPTII,S$GLB,, | Performed by: NURSE PRACTITIONER

## 2022-05-05 PROCEDURE — 1159F PR MEDICATION LIST DOCUMENTED IN MEDICAL RECORD: ICD-10-PCS | Mod: CPTII,S$GLB,, | Performed by: NURSE PRACTITIONER

## 2022-05-05 PROCEDURE — 1160F RVW MEDS BY RX/DR IN RCRD: CPT | Mod: CPTII,S$GLB,, | Performed by: NURSE PRACTITIONER

## 2022-05-05 PROCEDURE — 1101F PT FALLS ASSESS-DOCD LE1/YR: CPT | Mod: CPTII,S$GLB,, | Performed by: NURSE PRACTITIONER

## 2022-05-05 PROCEDURE — 3074F SYST BP LT 130 MM HG: CPT | Mod: CPTII,S$GLB,, | Performed by: NURSE PRACTITIONER

## 2022-05-05 PROCEDURE — 3008F BODY MASS INDEX DOCD: CPT | Mod: CPTII,S$GLB,, | Performed by: NURSE PRACTITIONER

## 2022-05-05 PROCEDURE — 3078F DIAST BP <80 MM HG: CPT | Mod: CPTII,S$GLB,, | Performed by: NURSE PRACTITIONER

## 2022-05-05 PROCEDURE — 1101F PR PT FALLS ASSESS DOC 0-1 FALLS W/OUT INJ PAST YR: ICD-10-PCS | Mod: CPTII,S$GLB,, | Performed by: NURSE PRACTITIONER

## 2022-05-05 PROCEDURE — 3074F PR MOST RECENT SYSTOLIC BLOOD PRESSURE < 130 MM HG: ICD-10-PCS | Mod: CPTII,S$GLB,, | Performed by: NURSE PRACTITIONER

## 2022-05-05 PROCEDURE — 3008F PR BODY MASS INDEX (BMI) DOCUMENTED: ICD-10-PCS | Mod: CPTII,S$GLB,, | Performed by: NURSE PRACTITIONER

## 2022-05-05 PROCEDURE — 3288F PR FALLS RISK ASSESSMENT DOCUMENTED: ICD-10-PCS | Mod: CPTII,S$GLB,, | Performed by: NURSE PRACTITIONER

## 2022-05-05 PROCEDURE — 1125F AMNT PAIN NOTED PAIN PRSNT: CPT | Mod: CPTII,S$GLB,, | Performed by: NURSE PRACTITIONER

## 2022-05-05 PROCEDURE — 99204 PR OFFICE/OUTPT VISIT, NEW, LEVL IV, 45-59 MIN: ICD-10-PCS | Mod: S$GLB,,, | Performed by: NURSE PRACTITIONER

## 2022-05-05 PROCEDURE — 3288F FALL RISK ASSESSMENT DOCD: CPT | Mod: CPTII,S$GLB,, | Performed by: NURSE PRACTITIONER

## 2022-05-05 PROCEDURE — 1125F PR PAIN SEVERITY QUANTIFIED, PAIN PRESENT: ICD-10-PCS | Mod: CPTII,S$GLB,, | Performed by: NURSE PRACTITIONER

## 2022-05-05 PROCEDURE — 99204 OFFICE O/P NEW MOD 45 MIN: CPT | Mod: S$GLB,,, | Performed by: NURSE PRACTITIONER

## 2022-05-05 PROCEDURE — 1160F PR REVIEW ALL MEDS BY PRESCRIBER/CLIN PHARMACIST DOCUMENTED: ICD-10-PCS | Mod: CPTII,S$GLB,, | Performed by: NURSE PRACTITIONER

## 2022-05-05 PROCEDURE — 1159F MED LIST DOCD IN RCRD: CPT | Mod: CPTII,S$GLB,, | Performed by: NURSE PRACTITIONER

## 2022-05-05 RX ORDER — GLUCOSAMINE/CHONDRO SU A 500-400 MG
1 TABLET ORAL NIGHTLY
COMMUNITY
Start: 2022-05-05 | End: 2024-01-01 | Stop reason: CLARIF

## 2022-05-05 RX ORDER — IBUPROFEN 200 MG
1 CAPSULE ORAL DAILY
COMMUNITY
End: 2023-01-01 | Stop reason: CLARIF

## 2022-05-05 RX ORDER — SEMAGLUTIDE 1.34 MG/ML
0.5 INJECTION, SOLUTION SUBCUTANEOUS
Qty: 3 PEN | Refills: 3 | Status: SHIPPED | OUTPATIENT
Start: 2022-05-05 | End: 2023-01-01

## 2022-05-05 RX ORDER — PANTOPRAZOLE SODIUM 40 MG/1
40 TABLET, DELAYED RELEASE ORAL DAILY
COMMUNITY
Start: 2022-03-09 | End: 2022-10-04 | Stop reason: SDUPTHER

## 2022-05-05 RX ORDER — ALBUTEROL SULFATE 90 UG/1
2 AEROSOL, METERED RESPIRATORY (INHALATION) EVERY 6 HOURS PRN
Qty: 18 G | Refills: 1 | Status: SHIPPED | OUTPATIENT
Start: 2022-05-05

## 2022-05-05 NOTE — PROGRESS NOTES
"Subjective:       Patient ID: Tam Koo is a 66 y.o. male.    Chief Complaint: Establish Care  the pt presents to establish care. Pt has the following active problems.     1. CAD-PCI/STEMI 9/2015. Pt also has HTN-followed byDr FARHAT-on toprol. On ASA  2. GERD-stable on protonix  3. Chronic constipation-stable on linzess  4. BPH-stable on flomax  5. DM-on metformin  Lab Results   Component Value Date    HGBA1C 5.7 (H) 08/12/2020   6. Dyslipidemia-stable on crestor, zetia.    Pt recently went to ER with urinary symptoms, pt had CT abd/pelvis-showed 1 cm nodule to the left lung base. Pt is former smoker. Due for LDCT    HPI  Review of Systems   Constitutional: Negative for activity change and appetite change.   HENT: Negative for congestion, postnasal drip, rhinorrhea and sinus pressure.    Eyes: Negative for pain and redness.   Respiratory: Negative for choking and chest tightness.    Gastrointestinal: Negative for abdominal distention, abdominal pain, blood in stool, constipation, diarrhea, nausea and vomiting.   Endocrine: Negative for polydipsia and polyphagia.   Genitourinary: Negative for dysuria and hematuria.   Musculoskeletal: Negative for arthralgias and myalgias.   Skin: Negative for color change and rash.   Neurological: Negative for dizziness and headaches.   Psychiatric/Behavioral: Negative for agitation and behavioral problems.       Past medical, surgical, family and social history reviewed.  Objective:     Vitals:    05/05/22 0915   BP: 114/76   Pulse: 63   Resp: 20   Weight: 107.5 kg (236 lb 15.9 oz)   Height: 5' 10" (1.778 m)   PainSc:   5   PainLoc: Back     Body mass index is 34.01 kg/m².     Physical Exam  Constitutional:       General: He is not in acute distress.     Appearance: He is well-developed. He is obese. He is not diaphoretic.   HENT:      Head: Normocephalic and atraumatic.      Right Ear: Hearing, tympanic membrane, ear canal and external ear normal.      Left Ear: Hearing, " tympanic membrane, ear canal and external ear normal.      Nose: Nose normal.      Mouth/Throat:      Pharynx: Uvula midline.   Eyes:      General:         Right eye: No discharge.         Left eye: No discharge.      Conjunctiva/sclera: Conjunctivae normal.      Pupils: Pupils are equal, round, and reactive to light.   Neck:      Thyroid: No thyromegaly.      Vascular: No carotid bruit or JVD.      Trachea: Trachea normal.   Cardiovascular:      Rate and Rhythm: Normal rate and regular rhythm.      Heart sounds: No murmur heard.    No friction rub. No gallop.   Pulmonary:      Effort: Pulmonary effort is normal. No respiratory distress.      Breath sounds: Normal breath sounds. No wheezing or rales.   Chest:      Chest wall: No tenderness.   Abdominal:      General: Bowel sounds are normal. There is no distension.      Palpations: Abdomen is soft. There is no mass.      Tenderness: There is no abdominal tenderness. There is no guarding or rebound.   Musculoskeletal:         General: Normal range of motion.      Cervical back: Normal range of motion and neck supple.   Skin:     General: Skin is warm and dry.   Neurological:      Mental Status: He is alert and oriented to person, place, and time.      Coordination: Coordination normal.   Psychiatric:         Behavior: Behavior normal.         Thought Content: Thought content normal.         Judgment: Judgment normal.         Assessment:       1. Type 2 diabetes mellitus without complication, without long-term current use of insulin    2. Encounter for long-term current use of medication    3. Prostate cancer screening    4. Encounter for screening for lung cancer    5. Need for hepatitis C screening test    6. Personal history of nicotine dependence     7. Morbid obesity    8. History of acute anterior wall MI    9. S/P coronary artery stent placement - LAD 09/2015    10. Dyslipidemia    11. Cardiomyopathy, ischemic        Plan:       Tam was seen today for  establish care.    Diagnoses and all orders for this visit:    Type 2 diabetes mellitus without complication, without long-term current use of insulin  -     semaglutide (OZEMPIC) 0.25 mg or 0.5 mg(2 mg/1.5 mL) pen injector; Inject 0.5 mg into the skin every 7 days.  Continue metformin    Encounter for long-term current use of medication  -     CBC Auto Differential; Future  -     Comprehensive Metabolic Panel; Future  -     Hemoglobin A1C; Future  -     Lipid Panel; Future  -     TSH; Future    Prostate cancer screening  -     PSA, Screening; Future    Encounter for screening for lung cancer  -     CT Chest Lung Screening Low Dose; Future  Shared decision making was discussed with this patient regarding LDCT for lung cancer screening.  I have discussed with this patient the benefits and harms of screening, follow-up diagnostic testing, over diagnosis, false positive rate as well as total radiation exposure. We also discussed the importance of adherence to annual lung cancer LDCT screening the impact of comorbidities in the ability or willingness to undergo diagnosis and treatment. We have discussed smoking cessation if the patient is still smoking and/or continued abstinence from smoking.      Need for hepatitis C screening test  -     Hepatitis C Antibody; Future    Personal history of nicotine dependence   -     CT Chest Lung Screening Low Dose; Future    Morbid obesity  Weight loss discussed    Other orders  -     albuterol (PROAIR HFA) 90 mcg/actuation inhaler; Inhale 2 puffs into the lungs every 6 (six) hours as needed for Wheezing. Rescue    CAD/cardiomyopathy-stable continue follow up with Dr Hernandez.    I spent 45 minutes on this encounter, time includes face-to-face, chart review, documentation, test review and orders.

## 2022-05-13 ENCOUNTER — PATIENT MESSAGE (OUTPATIENT)
Dept: FAMILY MEDICINE | Facility: CLINIC | Age: 67
End: 2022-05-13
Payer: MEDICARE

## 2022-05-19 ENCOUNTER — HOSPITAL ENCOUNTER (OUTPATIENT)
Dept: RADIOLOGY | Facility: HOSPITAL | Age: 67
Discharge: HOME | End: 2022-05-19
Attending: NURSE PRACTITIONER
Payer: MEDICARE

## 2022-05-19 ENCOUNTER — PATIENT MESSAGE (OUTPATIENT)
Dept: FAMILY MEDICINE | Facility: CLINIC | Age: 67
End: 2022-05-19
Payer: MEDICARE

## 2022-05-19 ENCOUNTER — TELEPHONE (OUTPATIENT)
Dept: FAMILY MEDICINE | Facility: CLINIC | Age: 67
End: 2022-05-19
Payer: MEDICARE

## 2022-05-19 DIAGNOSIS — R91.8 LUNG MASS: Primary | ICD-10-CM

## 2022-05-19 DIAGNOSIS — Z87.891 PERSONAL HISTORY OF NICOTINE DEPENDENCE: ICD-10-CM

## 2022-05-19 DIAGNOSIS — Z12.2 ENCOUNTER FOR SCREENING FOR LUNG CANCER: ICD-10-CM

## 2022-05-19 PROCEDURE — 71271 CT THORAX LUNG CANCER SCR C-: CPT | Mod: TC,PO

## 2022-05-19 PROCEDURE — 71271 CT CHEST LUNG SCREENING LOW DOSE: ICD-10-PCS | Mod: 26,,, | Performed by: RADIOLOGY

## 2022-05-19 PROCEDURE — 71271 CT THORAX LUNG CANCER SCR C-: CPT | Mod: 26,,, | Performed by: RADIOLOGY

## 2022-05-19 NOTE — TELEPHONE ENCOUNTER
The lung nodule is suspicious and it is recommended that we book with cardiothoracic surgery for possible biopsy. Of course if he would like to do a VV to discuss I would be glad to but I don't have any additional information just yet

## 2022-05-20 ENCOUNTER — TELEPHONE (OUTPATIENT)
Dept: VASCULAR SURGERY | Facility: CLINIC | Age: 67
End: 2022-05-20
Payer: MEDICARE

## 2022-05-20 NOTE — TELEPHONE ENCOUNTER
----- Message from Baldemar Arreola sent at 5/20/2022  8:25 AM CDT -----  Contact: Pt  Type: Needs Medical Advice    Who Called:Pt  Best Call Back Number:[phone]    Additional Information Requesting a call back regarding Pt was calling to get an appt set up pt does have a referral placed in the system please call when available Thank you  Please Advise-Thank you

## 2022-05-21 ENCOUNTER — PATIENT MESSAGE (OUTPATIENT)
Dept: FAMILY MEDICINE | Facility: CLINIC | Age: 67
End: 2022-05-21
Payer: MEDICARE

## 2022-05-23 ENCOUNTER — TELEPHONE (OUTPATIENT)
Dept: FAMILY MEDICINE | Facility: CLINIC | Age: 67
End: 2022-05-23
Payer: MEDICARE

## 2022-05-23 DIAGNOSIS — R91.8 LUNG MASS: Primary | ICD-10-CM

## 2022-05-25 ENCOUNTER — TELEPHONE (OUTPATIENT)
Dept: HEMATOLOGY/ONCOLOGY | Facility: CLINIC | Age: 67
End: 2022-05-25
Payer: MEDICARE

## 2022-05-25 ENCOUNTER — TELEPHONE (OUTPATIENT)
Dept: CARDIOLOGY | Facility: CLINIC | Age: 67
End: 2022-05-25
Payer: MEDICARE

## 2022-05-25 NOTE — TELEPHONE ENCOUNTER
----- Message from Rick Persaud sent at 5/25/2022  9:25 AM CDT -----  Regarding: advice  Contact: self  Type: Needs Medical Advice  Who Called:  self  Symptoms (please be specific):    How long has patient had these symptoms:    Pharmacy name and phone #:    Best Call Back Number: [phone]  Additional Information: Patient requesting a recommendation for a cardiologist.

## 2022-05-30 ENCOUNTER — PATIENT MESSAGE (OUTPATIENT)
Dept: CARDIOLOGY | Facility: CLINIC | Age: 67
End: 2022-05-30
Payer: MEDICARE

## 2022-06-13 ENCOUNTER — HOSPITAL ENCOUNTER (OUTPATIENT)
Dept: RADIOLOGY | Facility: HOSPITAL | Age: 67
Discharge: HOME | End: 2022-06-13
Attending: NURSE PRACTITIONER
Payer: MEDICARE

## 2022-06-13 DIAGNOSIS — R91.8 LUNG MASS: ICD-10-CM

## 2022-06-13 LAB — GLUCOSE SERPL-MCNC: 69 MG/DL (ref 70–110)

## 2022-06-13 PROCEDURE — 78815 PET IMAGE W/CT SKULL-THIGH: CPT | Mod: 26,PS,, | Performed by: RADIOLOGY

## 2022-06-13 PROCEDURE — A9552 F18 FDG: HCPCS | Mod: PN

## 2022-06-13 PROCEDURE — 78815 NM PET CT ROUTINE: ICD-10-PCS | Mod: 26,PS,, | Performed by: RADIOLOGY

## 2022-06-13 NOTE — PROGRESS NOTES
PET Imaging Questionnaire    1. Are you a Diabetic? Recent Blood Sugar level? No    2. Are you anemic? Bone Marrow Stimulation Meds? No    3. Have you had a CT Scan, if so when & where was your last one? Yes -     4. Have you had a PET Scan, if so when & where was your last one? No    5. Chemotherapy or currently on Chemotherapy? No    6. Radiation therapy? No    Surgical History:   Past Surgical History:   Procedure Laterality Date    EPIDURAL BLOCK INJECTION      lumbar    LAPAROSCOPIC GASTRIC BANDING      SLEEVE GASTROPLASTY     7.      8. Have you been fasting for at least 6 hours? Yes    9. Is there any chance you may be pregnant or breastfeeding? No    Assay: 14.7 MCi@:9.32   Injection Site:rt ac     Residual: 1.94 mCi@: 9.34   Technologist: Ness Heaton Injected:12.76mCi

## 2022-06-14 ENCOUNTER — DOCUMENTATION ONLY (OUTPATIENT)
Dept: HEMATOLOGY/ONCOLOGY | Facility: CLINIC | Age: 67
End: 2022-06-14
Payer: MEDICARE

## 2022-06-14 ENCOUNTER — OFFICE VISIT (OUTPATIENT)
Dept: PULMONOLOGY | Facility: CLINIC | Age: 67
End: 2022-06-14
Payer: MEDICARE

## 2022-06-14 ENCOUNTER — TELEPHONE (OUTPATIENT)
Dept: HEMATOLOGY/ONCOLOGY | Facility: CLINIC | Age: 67
End: 2022-06-14
Payer: MEDICARE

## 2022-06-14 VITALS
BODY MASS INDEX: 33.55 KG/M2 | RESPIRATION RATE: 18 BRPM | TEMPERATURE: 98 F | HEART RATE: 73 BPM | OXYGEN SATURATION: 98 % | HEIGHT: 70 IN | DIASTOLIC BLOOD PRESSURE: 70 MMHG | WEIGHT: 234.38 LBS | SYSTOLIC BLOOD PRESSURE: 110 MMHG

## 2022-06-14 DIAGNOSIS — R91.8 LUNG MASS: ICD-10-CM

## 2022-06-14 DIAGNOSIS — I25.2 HISTORY OF ACUTE ANTERIOR WALL MI: ICD-10-CM

## 2022-06-14 DIAGNOSIS — I89.8 CALCIFIED LYMPH NODES: ICD-10-CM

## 2022-06-14 DIAGNOSIS — R91.1 LUNG NODULE: ICD-10-CM

## 2022-06-14 DIAGNOSIS — I25.10 CORONARY ARTERY DISEASE INVOLVING NATIVE CORONARY ARTERY OF NATIVE HEART WITHOUT ANGINA PECTORIS: Primary | ICD-10-CM

## 2022-06-14 DIAGNOSIS — R94.2 ABNORMAL PET SCAN, LUNG: ICD-10-CM

## 2022-06-14 DIAGNOSIS — J43.2 CENTRIACINAR EMPHYSEMA: ICD-10-CM

## 2022-06-14 PROCEDURE — 1101F PT FALLS ASSESS-DOCD LE1/YR: CPT | Mod: CPTII,S$GLB,, | Performed by: INTERNAL MEDICINE

## 2022-06-14 PROCEDURE — 3074F PR MOST RECENT SYSTOLIC BLOOD PRESSURE < 130 MM HG: ICD-10-PCS | Mod: CPTII,S$GLB,, | Performed by: INTERNAL MEDICINE

## 2022-06-14 PROCEDURE — 1159F MED LIST DOCD IN RCRD: CPT | Mod: CPTII,S$GLB,, | Performed by: INTERNAL MEDICINE

## 2022-06-14 PROCEDURE — 99999 PR PBB SHADOW E&M-EST. PATIENT-LVL V: CPT | Mod: PBBFAC,,, | Performed by: INTERNAL MEDICINE

## 2022-06-14 PROCEDURE — 1159F PR MEDICATION LIST DOCUMENTED IN MEDICAL RECORD: ICD-10-PCS | Mod: CPTII,S$GLB,, | Performed by: INTERNAL MEDICINE

## 2022-06-14 PROCEDURE — 3078F DIAST BP <80 MM HG: CPT | Mod: CPTII,S$GLB,, | Performed by: INTERNAL MEDICINE

## 2022-06-14 PROCEDURE — 1101F PR PT FALLS ASSESS DOC 0-1 FALLS W/OUT INJ PAST YR: ICD-10-PCS | Mod: CPTII,S$GLB,, | Performed by: INTERNAL MEDICINE

## 2022-06-14 PROCEDURE — 3078F PR MOST RECENT DIASTOLIC BLOOD PRESSURE < 80 MM HG: ICD-10-PCS | Mod: CPTII,S$GLB,, | Performed by: INTERNAL MEDICINE

## 2022-06-14 PROCEDURE — 99204 PR OFFICE/OUTPT VISIT, NEW, LEVL IV, 45-59 MIN: ICD-10-PCS | Mod: S$GLB,,, | Performed by: INTERNAL MEDICINE

## 2022-06-14 PROCEDURE — 3008F BODY MASS INDEX DOCD: CPT | Mod: CPTII,S$GLB,, | Performed by: INTERNAL MEDICINE

## 2022-06-14 PROCEDURE — 3288F PR FALLS RISK ASSESSMENT DOCUMENTED: ICD-10-PCS | Mod: CPTII,S$GLB,, | Performed by: INTERNAL MEDICINE

## 2022-06-14 PROCEDURE — 3008F PR BODY MASS INDEX (BMI) DOCUMENTED: ICD-10-PCS | Mod: CPTII,S$GLB,, | Performed by: INTERNAL MEDICINE

## 2022-06-14 PROCEDURE — 3074F SYST BP LT 130 MM HG: CPT | Mod: CPTII,S$GLB,, | Performed by: INTERNAL MEDICINE

## 2022-06-14 PROCEDURE — 99999 PR PBB SHADOW E&M-EST. PATIENT-LVL V: ICD-10-PCS | Mod: PBBFAC,,, | Performed by: INTERNAL MEDICINE

## 2022-06-14 PROCEDURE — 1126F PR PAIN SEVERITY QUANTIFIED, NO PAIN PRESENT: ICD-10-PCS | Mod: CPTII,S$GLB,, | Performed by: INTERNAL MEDICINE

## 2022-06-14 PROCEDURE — 99204 OFFICE O/P NEW MOD 45 MIN: CPT | Mod: S$GLB,,, | Performed by: INTERNAL MEDICINE

## 2022-06-14 PROCEDURE — 1126F AMNT PAIN NOTED NONE PRSNT: CPT | Mod: CPTII,S$GLB,, | Performed by: INTERNAL MEDICINE

## 2022-06-14 PROCEDURE — 3288F FALL RISK ASSESSMENT DOCD: CPT | Mod: CPTII,S$GLB,, | Performed by: INTERNAL MEDICINE

## 2022-06-14 RX ORDER — LACTULOSE 10 G/15ML
SOLUTION ORAL; RECTAL
COMMUNITY
Start: 2022-03-10 | End: 2022-08-16 | Stop reason: ALTCHOICE

## 2022-06-14 RX ORDER — CIPROFLOXACIN 500 MG/1
500 TABLET ORAL 2 TIMES DAILY
COMMUNITY
Start: 2022-03-17 | End: 2022-08-16 | Stop reason: ALTCHOICE

## 2022-06-14 RX ORDER — ZOLPIDEM TARTRATE 10 MG/1
10 TABLET ORAL NIGHTLY PRN
COMMUNITY
Start: 2022-04-05

## 2022-06-14 RX ORDER — MUPIROCIN 20 MG/G
OINTMENT TOPICAL 2 TIMES DAILY
COMMUNITY
Start: 2022-06-21 | End: 2022-06-25

## 2022-06-14 RX ORDER — DOXYCYCLINE 100 MG/1
CAPSULE ORAL
COMMUNITY
Start: 2022-05-10 | End: 2022-08-16 | Stop reason: ALTCHOICE

## 2022-06-14 NOTE — PROGRESS NOTES
[facility]      THORACIC MULTIDISCIPLINARY TUMOR BOARD  PATIENT REVIEW FORM  ________________________________________________________________________    CLINIC #: [id number]  DATE: 06/14/2022    TUMOR SITE:   Left lung mass    PRESENTER:   Dr. Lerner, Dr. Beck     PATIENT SUMMARY:   Significant coronary disease, lap band 2010, osteoarthritis   42 year hx smoking 1 PPD; quit age 54  Last echo April 2021 Normal EF at55%; grade I diastolic dysfunction.  COVID   5/5/22: CT chest lung cancer screening shows subpleural nodule, new in 3/22 now increasing in size from 9 to 11 mm  5/23/22: PET/CT Poorly metabolic LLL subpleural nodule. Left hilar lymph node with SUV 5      BOARD RECOMMENDATIONS:   EBUS left hilar node and MRI brain, if negative then surgical resection since her EF has improved     CONSULT NEEDED:     [] Surgery    [] Hem/Onc    [] Rad/Onc    [] Dietary                 [] Social Service    [] Psychology       [] Pulmonology    GROUP STAGE:     [] O    [] 1A    [] IB    [] IIA    [] IIB     [] IIIA     [] IIIB     [] IIIC    [] IV                               [] Local recurrence     [] Regional recurrence     [] Distant recurrence                   [] NSCLC     [] SCLC     Tumor type     Unstageable:      [] Yes     [] No  Metastatic site(s):          [x] Tamara'l Treatment Guidelines reviewed and care planned is consistent with guidelines.         (i.e., NCCN, NCI, PD, ACO, AUA, etc.)    PRESENTATION AT CANCER CONFERENCE:         [x] Prospective    [] Retrospective     [] Follow-Up          [] Eligible for clinical trial      Ana Haddad

## 2022-06-14 NOTE — PROGRESS NOTES
"        [facility]   Initial Office Visit  No chief complaint on file.      SUBJECTIVE:     History of Present Illness:  Patient is a 66 y.o. male presents for evaluation. Pulmonary evaluation for Abnormal CT chest referred by Shu Koch NP    Patient reports 65 yo male, referred fr LLL nodule.   History of Lap band 2010.  Edema LE and osteoarthritis of his knees.  AMI anterior wall.Sept 2015. EF at the time 30%  Frequent PVC's  Last echo April 2021 Normal EF at55%; grade I diastolic dysfunction.  PAP 29 mmHg    HAd CT lung cancer screening and found to have calcified left hilat LN and a 11 mm pleural based nodule posteriorly LLL that abuts the pleura  LLL nodule measured at 11 mm and it has apparently increased by 2 mm compared to lower chest cuts from CT abdomen from March 2022. The LLL nodule has an SUV of 2.1; there is a hypermetabolic left hilar LN , either 11L or 10 L    Reports "bronchitis" a bunch of times. Had Covid Feb 2022.  Was vaccinated and boosted before.  Patient denies CP not hemoptysis.     Exposures: Worked for [employer] and many jobs. 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    Smoking Hx: Started to smoke at age 12. Quit at age 54;  42 years 1 PPD    Past Medical History:   Diagnosis Date    Asthma     Diabetes mellitus, type 2     History of acute anterior wall MI 03/12/2020 2015    Hyperlipidemia     Hypertension     Kidney stone      Past Surgical History:   Procedure Laterality Date    EPIDURAL BLOCK INJECTION      lumbar    LAPAROSCOPIC GASTRIC BANDING      SLEEVE GASTROPLASTY       Family History   Problem Relation Age of Onset    Cancer Mother     Diabetes Mother     COPD Father     Heart disease Father      Social History     Tobacco Use    Smoking status: Former Smoker    Smokeless tobacco: Current " User     Types: Snuff   Substance Use Topics    Alcohol use: Yes    Drug use: Never        Review of patient's allergies indicates:   Allergen Reactions    Ether     Penicillins      Unknown reaction        Current Outpatient Medications on File Prior to Visit   Medication Sig Dispense Refill    albuterol (PROAIR HFA) 90 mcg/actuation inhaler Inhale 2 puffs into the lungs every 6 (six) hours as needed for Wheezing. Rescue 18 g 1    amitriptyline (ELAVIL) 50 MG tablet Take 50 mg by mouth every evening.      aspirin (ECOTRIN) 81 MG EC tablet Take 81 mg by mouth once daily.      calcium citrate (CALCITRATE) 200 mg (950 mg) tablet Take 1 tablet by mouth once daily.      ezetimibe (ZETIA) 10 mg tablet Take 1 tablet (10 mg total) by mouth once daily. 90 tablet 4    glucosamine-chondroitin 500-400 mg tablet Take 1 tablet by mouth 3 (three) times daily.      linaCLOtide (LINZESS) 145 mcg Cap capsule Take 145 mcg by mouth before breakfast.      metFORMIN (GLUCOPHAGE) 500 MG tablet Take 500 mg by mouth once daily.      metoprolol succinate (TOPROL-XL) 25 MG 24 hr tablet Take 1 tablet (25 mg total) by mouth every evening. 90 tablet 3    multivit-min/FA/lycopen/lutein (CENTRUM SILVER ULTRA MEN'S ORAL) Take by mouth.      nitroGLYCERIN (NITROSTAT) 0.4 MG SL tablet DISSOLVE 1 TABLET UNDER THE TONGUE EVERY 5 MINUTES AS NEEDED FOR CHEST PAIN 25 tablet 5    pantoprazole (PROTONIX) 40 MG tablet Take 40 mg by mouth once daily.      rosuvastatin (CRESTOR) 5 MG tablet Take 1 tablet (5 mg total) by mouth every evening. 90 tablet 3    semaglutide (OZEMPIC) 0.25 mg or 0.5 mg(2 mg/1.5 mL) pen injector Inject 0.5 mg into the skin every 7 days. 3 pen 3    tamsulosin (FLOMAX) 0.4 mg Cp24 Take 0.4 mg by mouth once daily.      ZOLPIDEM TARTRATE (AMBIEN ORAL) Take 10 mg by mouth every evening.       No current facility-administered medications on file prior to visit.         Review of Systems     Constitutional:Reports snoring  and it takes him a little bit longer to wake up after anesthesia  HENT: Negative unless otherwise commented above  Eyes: Negative unless otherwise commented above  Respiratory: Negative unless otherwise commented above  Cardiovascular: Negative unless otherwise commented above  Musculoskeletal: Negative unless otherwise commented above  Skin: Negative unless otherwise commented above  Neurological: Negative unless otherwise commented above  Hematological: Negative unless otherwise commented above  Endocrine: Negative unless otherwise commented above    OBJECTIVE:     Vital Signs (Most Recent)  There were no vitals taken for this visit.           Physical Exam:    General: well developed well nourished   Eye: Extraocular movements are intact, Normal conjunctiva.  No scleral icterus  HENT: Normocephalic, Oral mucosa is moist, No pharyngeal erythema, clear oropharynx  Neck: Supple, Non-tender, No jugular venous distention.  Respiratory: CTa   Cardiovascular: RRR   Extremities: no CCE  Gastrointestinal: Soft, Non-tender, Non-distended   Lymphatics: No lymphadenopathy neck, supraclavicular.   Integumentary: Warm, Dry.   Neurologic: Alert, Oriented, Normal motor function, No focal defects.       Diagnostic Results:    PFT Results  FEV1 2.88 (86%); no response to bronchodilator; normal lung volumes; DlCO 22.8 (91%)  No data recorded   No data recorded  No data recorded  No data recorded  No data recorded  No data recorded  No data recorded  No data recorded  No data recorded  No data recorded  No data recorded  No data recorded    Significant Imaging:  CXR: CT as described above; LLL nodule with calcium stipling. Left hilar LN and Pet positive 10L or 11L     Test(s) Reviewed  I have personally reviewed the following in detail:  [x] Chest Xray Images   [x] CT Images   [x] Echocardiogram   [x] Labs   [] Other:       Assessment:       No diagnosis found.     Plan:   There are no diagnoses linked to this encounter.     By Don  node risk calculator: 11% risk of malignancy  In my assessment, calcium stippling LLL and left hilar calcified LN and positive PET; High risk for lung cancer due to smoking history; it is appropriate to proceed with EBUS and sampling of LN  Pt agreeable. Jeronimo consider for next week  No follow-ups on file.     See labs and med orders.    Medications have been reviewed and reconciled.      Portions of this note may have been created with voice recognition software.  Grammatical, syntax and spelling errors may be inevitable.

## 2022-06-14 NOTE — NURSING
Met with patient and family in multi disciplinary clinic today. Reviewed plan of care and answered questions.  Pt is scheduled for EBUS on 6/22/2022.   Contact information was provided and pt was encouraged to call with any questions or concerns.  Pt verbalized an understanding.

## 2022-06-28 DIAGNOSIS — R94.2 ABNORMAL PET SCAN OF LUNG: Primary | ICD-10-CM

## 2022-07-01 ENCOUNTER — TELEPHONE (OUTPATIENT)
Dept: HEMATOLOGY/ONCOLOGY | Facility: CLINIC | Age: 67
End: 2022-07-01
Payer: MEDICARE

## 2022-07-01 NOTE — NURSING
Followed up with patient from lung clinic. Dr. Lerner discussed results with Dr. Lerner and plan to follow up in 3 months with repeat CT chest with contrast. Spoke with patient and he verbalized understanding

## 2022-07-12 ENCOUNTER — TELEPHONE (OUTPATIENT)
Dept: HEMATOLOGY/ONCOLOGY | Facility: CLINIC | Age: 67
End: 2022-07-12
Payer: MEDICARE

## 2022-07-12 NOTE — TELEPHONE ENCOUNTER
Pt stated he received forms from [employer] to sign, and noted he didn't sign them at time of procedure and needed sig to file.  He said he spoke to someone in the office about this and just wanted to make sure it was not a scam before he signed it.  Please advise the pt. Looks like pt only sees Multi D clinic here.

## 2022-07-12 NOTE — TELEPHONE ENCOUNTER
Spoke with pt and informed him that since his path was negative the [employer] test was canceled and there is no need for him to sign the forms. He may disregard them.

## 2022-08-08 ENCOUNTER — CLINICAL SUPPORT (OUTPATIENT)
Dept: CARDIOLOGY | Facility: HOSPITAL | Age: 67
End: 2022-08-08
Attending: INTERNAL MEDICINE
Payer: MEDICARE

## 2022-08-08 VITALS — BODY MASS INDEX: 33.21 KG/M2 | HEIGHT: 70 IN | WEIGHT: 232 LBS

## 2022-08-08 LAB
ASCENDING AORTA: 2.96 CM
AV INDEX (PROSTH): 0.83
AV MEAN GRADIENT: 4 MMHG
AV PEAK GRADIENT: 7 MMHG
AV VALVE AREA: 2.53 CM2
AV VELOCITY RATIO: 0.8
BSA FOR ECHO PROCEDURE: 2.28 M2
CV ECHO LV RWT: 0.33 CM
DOP CALC AO PEAK VEL: 1.36 M/S
DOP CALC AO VTI: 34.27 CM
DOP CALC LVOT AREA: 3 CM2
DOP CALC LVOT DIAMETER: 1.97 CM
DOP CALC LVOT PEAK VEL: 1.09 M/S
DOP CALC LVOT STROKE VOLUME: 86.55 CM3
DOP CALCLVOT PEAK VEL VTI: 28.41 CM
E WAVE DECELERATION TIME: 169.89 MSEC
E/A RATIO: 1.31
E/E' RATIO: 9.63 M/S
ECHO LV POSTERIOR WALL: 0.88 CM (ref 0.6–1.1)
EJECTION FRACTION: 60 %
FRACTIONAL SHORTENING: 33 % (ref 28–44)
INTERVENTRICULAR SEPTUM: 0.79 CM (ref 0.6–1.1)
IVRT: 133.21 MSEC
LA MAJOR: 5.72 CM
LA MINOR: 5.03 CM
LA WIDTH: 3.79 CM
LEFT ATRIUM SIZE: 3.2 CM
LEFT ATRIUM VOLUME INDEX: 24.9 ML/M2
LEFT ATRIUM VOLUME: 55.18 CM3
LEFT INTERNAL DIMENSION IN SYSTOLE: 3.56 CM (ref 2.1–4)
LEFT VENTRICLE DIASTOLIC VOLUME INDEX: 61.27 ML/M2
LEFT VENTRICLE DIASTOLIC VOLUME: 136.01 ML
LEFT VENTRICLE MASS INDEX: 72 G/M2
LEFT VENTRICLE SYSTOLIC VOLUME INDEX: 23.8 ML/M2
LEFT VENTRICLE SYSTOLIC VOLUME: 52.83 ML
LEFT VENTRICULAR INTERNAL DIMENSION IN DIASTOLE: 5.31 CM (ref 3.5–6)
LEFT VENTRICULAR MASS: 158.97 G
LV LATERAL E/E' RATIO: 8.56 M/S
LV SEPTAL E/E' RATIO: 11 M/S
MV A" WAVE DURATION": 14.46 MSEC
MV PEAK A VEL: 0.59 M/S
MV PEAK E VEL: 0.77 M/S
MV STENOSIS PRESSURE HALF TIME: 49.27 MS
MV VALVE AREA P 1/2 METHOD: 4.47 CM2
PISA TR MAX VEL: 2.44 M/S
PULM VEIN S/D RATIO: 1.43
PV PEAK D VEL: 0.35 M/S
PV PEAK S VEL: 0.5 M/S
RA MAJOR: 5.25 CM
RA PRESSURE: 3 MMHG
RA WIDTH: 3.7 CM
RIGHT VENTRICULAR END-DIASTOLIC DIMENSION: 3.96 CM
RV TISSUE DOPPLER FREE WALL SYSTOLIC VELOCITY 1 (APICAL 4 CHAMBER VIEW): 11.44 CM/S
SINUS: 2.88 CM
STJ: 2.67 CM
TDI LATERAL: 0.09 M/S
TDI SEPTAL: 0.07 M/S
TDI: 0.08 M/S
TR MAX PG: 24 MMHG
TRICUSPID ANNULAR PLANE SYSTOLIC EXCURSION: 2.09 CM
TV REST PULMONARY ARTERY PRESSURE: 27 MMHG

## 2022-08-08 PROCEDURE — 93306 TTE W/DOPPLER COMPLETE: CPT | Mod: PO

## 2022-08-29 ENCOUNTER — PATIENT MESSAGE (OUTPATIENT)
Dept: HEMATOLOGY/ONCOLOGY | Facility: CLINIC | Age: 67
End: 2022-08-29
Payer: MEDICARE

## 2022-09-09 ENCOUNTER — HOSPITAL ENCOUNTER (OUTPATIENT)
Dept: RADIOLOGY | Facility: HOSPITAL | Age: 67
Discharge: HOME | End: 2022-09-09
Attending: INTERNAL MEDICINE
Payer: MEDICARE

## 2022-09-09 DIAGNOSIS — R94.2 ABNORMAL PET SCAN OF LUNG: ICD-10-CM

## 2022-09-09 PROCEDURE — 71260 CT CHEST WITH CONTRAST: ICD-10-PCS | Mod: 26,,, | Performed by: RADIOLOGY

## 2022-09-09 PROCEDURE — 71260 CT THORAX DX C+: CPT | Mod: 26,,, | Performed by: RADIOLOGY

## 2022-09-09 PROCEDURE — 71260 CT THORAX DX C+: CPT | Mod: TC,PO

## 2022-09-09 PROCEDURE — 25500020 PHARM REV CODE 255: Mod: PO | Performed by: INTERNAL MEDICINE

## 2022-09-09 RX ADMIN — IOHEXOL 75 ML: 350 INJECTION, SOLUTION INTRAVENOUS at 08:09

## 2022-09-12 ENCOUNTER — TELEPHONE (OUTPATIENT)
Dept: HEMATOLOGY/ONCOLOGY | Facility: CLINIC | Age: 67
End: 2022-09-12
Payer: MEDICARE

## 2022-09-12 NOTE — NURSING
Reached out to patient to confirm scheduled pulmonology visit with Dr. Lerner tomorrow at 1130.   LVM with appt details and callback number.

## 2022-09-13 ENCOUNTER — TUMOR BOARD CONFERENCE (OUTPATIENT)
Dept: HEMATOLOGY/ONCOLOGY | Facility: CLINIC | Age: 67
End: 2022-09-13
Payer: MEDICARE

## 2022-09-13 ENCOUNTER — TELEPHONE (OUTPATIENT)
Dept: HEMATOLOGY/ONCOLOGY | Facility: CLINIC | Age: 67
End: 2022-09-13
Payer: MEDICARE

## 2022-09-13 ENCOUNTER — OFFICE VISIT (OUTPATIENT)
Dept: PULMONOLOGY | Facility: CLINIC | Age: 67
End: 2022-09-13
Payer: MEDICARE

## 2022-09-13 VITALS
HEIGHT: 70 IN | WEIGHT: 227.88 LBS | HEART RATE: 66 BPM | TEMPERATURE: 98 F | BODY MASS INDEX: 32.62 KG/M2 | SYSTOLIC BLOOD PRESSURE: 125 MMHG | OXYGEN SATURATION: 95 % | DIASTOLIC BLOOD PRESSURE: 65 MMHG | RESPIRATION RATE: 18 BRPM

## 2022-09-13 DIAGNOSIS — I89.8 CALCIFIED LYMPH NODES: ICD-10-CM

## 2022-09-13 DIAGNOSIS — R91.1 LUNG NODULE: ICD-10-CM

## 2022-09-13 DIAGNOSIS — F17.200 SMOKING: ICD-10-CM

## 2022-09-13 DIAGNOSIS — R94.2 ABNORMAL PET SCAN, LUNG: Primary | ICD-10-CM

## 2022-09-13 DIAGNOSIS — J43.2 CENTRIACINAR EMPHYSEMA: ICD-10-CM

## 2022-09-13 PROCEDURE — 1125F AMNT PAIN NOTED PAIN PRSNT: CPT | Mod: CPTII,S$GLB,, | Performed by: INTERNAL MEDICINE

## 2022-09-13 PROCEDURE — 99214 OFFICE O/P EST MOD 30 MIN: CPT | Mod: S$GLB,,, | Performed by: INTERNAL MEDICINE

## 2022-09-13 PROCEDURE — 99999 PR PBB SHADOW E&M-EST. PATIENT-LVL IV: ICD-10-PCS | Mod: PBBFAC,,, | Performed by: INTERNAL MEDICINE

## 2022-09-13 PROCEDURE — 1159F PR MEDICATION LIST DOCUMENTED IN MEDICAL RECORD: ICD-10-PCS | Mod: CPTII,S$GLB,, | Performed by: INTERNAL MEDICINE

## 2022-09-13 PROCEDURE — 3288F FALL RISK ASSESSMENT DOCD: CPT | Mod: CPTII,S$GLB,, | Performed by: INTERNAL MEDICINE

## 2022-09-13 PROCEDURE — 1101F PT FALLS ASSESS-DOCD LE1/YR: CPT | Mod: CPTII,S$GLB,, | Performed by: INTERNAL MEDICINE

## 2022-09-13 PROCEDURE — 3044F PR MOST RECENT HEMOGLOBIN A1C LEVEL <7.0%: ICD-10-PCS | Mod: CPTII,S$GLB,, | Performed by: INTERNAL MEDICINE

## 2022-09-13 PROCEDURE — 3008F PR BODY MASS INDEX (BMI) DOCUMENTED: ICD-10-PCS | Mod: CPTII,S$GLB,, | Performed by: INTERNAL MEDICINE

## 2022-09-13 PROCEDURE — 1125F PR PAIN SEVERITY QUANTIFIED, PAIN PRESENT: ICD-10-PCS | Mod: CPTII,S$GLB,, | Performed by: INTERNAL MEDICINE

## 2022-09-13 PROCEDURE — 3074F SYST BP LT 130 MM HG: CPT | Mod: CPTII,S$GLB,, | Performed by: INTERNAL MEDICINE

## 2022-09-13 PROCEDURE — 3008F BODY MASS INDEX DOCD: CPT | Mod: CPTII,S$GLB,, | Performed by: INTERNAL MEDICINE

## 2022-09-13 PROCEDURE — 99999 PR PBB SHADOW E&M-EST. PATIENT-LVL IV: CPT | Mod: PBBFAC,,, | Performed by: INTERNAL MEDICINE

## 2022-09-13 PROCEDURE — 99214 PR OFFICE/OUTPT VISIT, EST, LEVL IV, 30-39 MIN: ICD-10-PCS | Mod: S$GLB,,, | Performed by: INTERNAL MEDICINE

## 2022-09-13 PROCEDURE — 3074F PR MOST RECENT SYSTOLIC BLOOD PRESSURE < 130 MM HG: ICD-10-PCS | Mod: CPTII,S$GLB,, | Performed by: INTERNAL MEDICINE

## 2022-09-13 PROCEDURE — 3044F HG A1C LEVEL LT 7.0%: CPT | Mod: CPTII,S$GLB,, | Performed by: INTERNAL MEDICINE

## 2022-09-13 PROCEDURE — 3078F PR MOST RECENT DIASTOLIC BLOOD PRESSURE < 80 MM HG: ICD-10-PCS | Mod: CPTII,S$GLB,, | Performed by: INTERNAL MEDICINE

## 2022-09-13 PROCEDURE — 1101F PR PT FALLS ASSESS DOC 0-1 FALLS W/OUT INJ PAST YR: ICD-10-PCS | Mod: CPTII,S$GLB,, | Performed by: INTERNAL MEDICINE

## 2022-09-13 PROCEDURE — 1159F MED LIST DOCD IN RCRD: CPT | Mod: CPTII,S$GLB,, | Performed by: INTERNAL MEDICINE

## 2022-09-13 PROCEDURE — 3078F DIAST BP <80 MM HG: CPT | Mod: CPTII,S$GLB,, | Performed by: INTERNAL MEDICINE

## 2022-09-13 PROCEDURE — 3288F PR FALLS RISK ASSESSMENT DOCUMENTED: ICD-10-PCS | Mod: CPTII,S$GLB,, | Performed by: INTERNAL MEDICINE

## 2022-09-13 NOTE — PROGRESS NOTES
[facility]   Clinic Follow Up  Chief Complaint   Patient presents with    Follow-up       SUBJECTIVE:     History of Present Illness:  Patient is a 66 y.o. male presents for follow-up for abnorma;l PET. LLL nodule.    Patient reports EBUWS with negative 10L sampling . No material obtained despite multiple sticks. Has a PET negative LLL post basal nodule The patient has been examined and the H&P has been reviewed:  The sequential CTs of the chest reviewed    Right lower lobe nodule developed since 2013.  Nothing in between 2013 2022.  There is a calcified left hilar node.  Hypermetabolic node station 10 L.  Sampling was dry from that station.  Otherwise rest of mediastinal sampling normal lymphoid tissue     Today reviewed in conference left lower lobe nodule has Hounsfield units of 123 as per .  He stated that that is too high for just sub tissue in his suspects there is calcification.  That would make sense the whole picture with fibrinous left hilar node and a left lower lobe calcified granuloma    No obvious increase in left hilar volume per imaging      42 pack year history of smoking.     Patient denies fever cough hemoptysis.    Recent admissions/ER visits/Urgent care: no      Past Medical History:   Diagnosis Date    Asthma     Cancer     skin, left arm    Coronary artery disease     Mary Wang    Diabetes mellitus, type 2     History of acute anterior wall MI 03/12/2015 2015    Hyperlipidemia     Hypertension     Kidney stone      Past Surgical History:   Procedure Laterality Date    CORONARY ANGIOPLASTY WITH STENT PLACEMENT  2015    X1    ENDOBRONCHIAL ULTRASOUND Bilateral 6/23/2022    Procedure: ENDOBRONCHIAL ULTRASOUND (EBUS);  Surgeon: Grzegorz Lerner MD;  Location: [facility];  Service: Pulmonary;  Laterality: Bilateral;  10 L and 11L    EPIDURAL BLOCK INJECTION      lumbar    LAPAROSCOPIC GASTRIC BANDING  2010    SLEEVE GASTROPLASTY  2018     Family History    Problem Relation Age of Onset    Cancer Mother     Diabetes Mother     COPD Father     Heart disease Father     Asthma Sister      Social History     Tobacco Use    Smoking status: Former     Types: Cigarettes     Quit date:      Years since quittin.7    Smokeless tobacco: Current     Types: Snuff   Substance Use Topics    Alcohol use: Yes     Alcohol/week: 10.0 standard drinks     Types: 10 Cans of beer per week    Drug use: Never        Review of patient's allergies indicates:   Allergen Reactions    Ether      Pt reports stop breathing     Penicillins      Unknown reaction        Current Outpatient Medications on File Prior to Visit   Medication Sig Dispense Refill    albuterol (PROAIR HFA) 90 mcg/actuation inhaler Inhale 2 puffs into the lungs every 6 (six) hours as needed for Wheezing. Rescue 18 g 1    amitriptyline (ELAVIL) 50 MG tablet Take 50 mg by mouth every evening.      aspirin (ECOTRIN) 81 MG EC tablet Take 81 mg by mouth once daily.      calcium citrate (CALCITRATE) 200 mg (950 mg) tablet Take 1 tablet by mouth once daily.      ezetimibe (ZETIA) 10 mg tablet Take 1 tablet (10 mg total) by mouth once daily. 90 tablet 4    glucosamine-chondroitin 500-400 mg tablet Take 1 tablet by mouth 3 (three) times daily.      linaCLOtide (LINZESS) 145 mcg Cap capsule Take 145 mcg by mouth before breakfast.      metoprolol succinate (TOPROL-XL) 25 MG 24 hr tablet TAKE 1 TABLET (25 MG TOTAL) EVERY EVENING. 90 tablet 3    multivit-min/FA/lycopen/lutein (CENTRUM SILVER ULTRA MEN'S ORAL) Take by mouth.      nitroGLYCERIN (NITROSTAT) 0.4 MG SL tablet DISSOLVE 1 TABLET UNDER THE TONGUE EVERY 5 MINUTES AS NEEDED FOR CHEST PAIN 25 tablet 5    rosuvastatin (CRESTOR) 5 MG tablet TAKE 1 TABLET (5 MG TOTAL) EVERY EVENING. 90 tablet 3    semaglutide (OZEMPIC) 0.25 mg or 0.5 mg(2 mg/1.5 mL) pen injector Inject 0.5 mg into the skin every 7 days. 3 pen 3    tamsulosin (FLOMAX) 0.4 mg Cp24 Take 0.4 mg by mouth once daily.   "    zolpidem (AMBIEN) 10 mg Tab Take 10 mg by mouth nightly as needed.      pantoprazole (PROTONIX) 40 MG tablet Take 40 mg by mouth once daily.       No current facility-administered medications on file prior to visit.         Review of Systems     Constitutional: Negative unless otherwise commented above  HENT: Negative unless otherwise commented above  Eyes: Negative unless otherwise commented above  Respiratory: Negative unless otherwise commented above  Cardiovascular: Negative unless otherwise commented above  Musculoskeletal: Negative unless otherwise commented above  Skin: Negative unless otherwise commented above  Neurological: Negative unless otherwise commented above  Hematological: Negative unless otherwise commented above  Endocrine: Negative unless otherwise commented above    OBJECTIVE:     Vital Signs (Most Recent)  Visit Vitals  /65 (BP Location: Left arm, Patient Position: Sitting, BP Method: Medium (Automatic))   Pulse 66   Temp 98.4 °F (36.9 °C) (Temporal)   Resp 18   Ht 5' 10" (1.778 m)   Wt 103.4 kg (227 lb 13.5 oz)   SpO2 95%   BMI 32.69 kg/m²     5' 10" (1.778 m)  103.4 kg (227 lb 13.5 oz)     Physical Exam:    General: Alert and oriented, No acute distress.   Eye: Extraocular movements are intact, Normal conjunctiva.  No scleral icterus  HENT: Normocephalic, Normal hearing, Oral mucosa is moist, No pharyngeal erythema.   Neck: Supple, Non-tender, No jugular venous distention.  Respiratory:  Clear to auscultation   Cardiovascular:  Regular rate and rhythm   Extremities:  No edema  Gastrointestinal: Soft, Non-tender, Non-distended   Lymphatics: No lymphadenopathy appreciated, no masses.   Integumentary: Warm, Dry.   Neurologic: Moves all extremities, No focal defects.     Diagnostic Results:    PFT Results  June 2022 FEV1 2.88 ( 86%) DlCO 22.8 (91%)  No data recorded  No data recorded  No data recorded  No data recorded  No data recorded  No data recorded  No data recorded  No data " recorded  No data recorded  No data recorded  SpO2: 95 %    Significant Imaging:  CXR: CT chest with stable imaging    Test(s) Reviewed  I have personally reviewed the following in detail:  [x] Chest Xray Images   [x] CT Images   [] Echocardiogram   [] Labs   [] Other:       Assessment:         ICD-10-CM ICD-9-CM   1. Abnormal PET scan, lung  R94.2 794.2   2. Centriacinar emphysema  J43.2 492.8   3. Lung nodule  R91.1 793.11   4. Calcified lymph nodes  I89.8 457.8   5. Smoking  F17.200 305.1        Plan:   Abnormal PET scan, lung    Centriacinar emphysema    Lung nodule    Calcified lymph nodes    Smoking     Will obtain Nodify to help stratify the risk.  He gives a risk of about 35% based on history of squamous cell lung cancer of his arm.  We will also get follow-up imaging end of January 2023 because patient has plans to go hunting in December     Case discussed in multidisciplinary conference patient updated  No follow-ups on file.     See labs and med orders.    Medications have been reviewed and reconciled.    Portions of this note may have been created with voice recognition software.  Grammatical, syntax and spelling errors may be inevitable.

## 2022-09-13 NOTE — PROGRESS NOTES
[facility]      THORACIC MULTIDISCIPLINARY TUMOR BOARD  PATIENT REVIEW FORM  ________________________________________________________________________    CLINIC #: [id number]  DATE: 09/13/2022    TUMOR SITE:   Left lower lung    PRESENTER:   Yann    PATIENT SUMMARY:   Significant coronary disease, lap band 2010, osteoarthritis   42 year hx smoking 1 PPD; quit age 54  Last echo April 2021 Normal EF at 55%; grade I diastolic dysfunction.  COVID   5/5/22: CT chest lung cancer screening shows subpleural nodule, new in 3/22 now increasing in size from 9 to 11 mm  5/23/22: PET/CT Poorly metabolic LLL subpleural nodule. Left hilar lymph node with SUV 5    Presented to Tumor board 06/14/22 with recommendation:EBUS left hilar node and MRI brain, if negative then surgical resection since her EF has improved     06/23/22:  EBUS; no tissue despite multiple sticks; no growth    08/16/22:  EF 59%    09/09/22:  CT CHEST with:  1. No interval detrimental change when compared to the prior study.  Multiple scattered solid pulmonary nodules measuring up to 11 mm in size, largest in the left lower lobe posteriorly.  2. 10 x 12 mm borderline enlarged left hilar lymph node.  3. 25 mm unchanged left renal hypodensity, most likely a cyst.  4. Remote T7 superior endplate compression fracture  5. Hiatal hernia  6. Postoperative changes of the stomach and gastroesophageal junction.     TODAY:  left lower lobe nodule has Hounsfield units of 123 as per . He stated that that is too high for just sub tissue in his suspects there is calcification.  That would make sense the whole picture with fibrinous left hilar node and a left lower lobe calcified granuloma    BOARD RECOMMENDATIONS:   Repeat CT PET              [x] Tamara'l Treatment Guidelines reviewed and care planned is consistent with guidelines.         (i.e., NCCN, NCI, PD, ACO, AUA, etc.)    PRESENTATION AT CANCER CONFERENCE:         [x] Prospective    [] Retrospective      [] Follow-Up          [] Eligible for clinical trial      Salvatore Kearns NP

## 2022-09-13 NOTE — NURSING
Discussed NODIFY Test with patient. Advised him that [facility] would be contacting him to coordinate a lab draw. NODIFY request form faxed to [facility] [phone]. He will follow up with Dr. Lerner in 4 months with PET scan. Will contact patient to schedule as it gets closer to his f/u date. Patient verbalized inderstanding

## 2022-10-03 ENCOUNTER — PATIENT MESSAGE (OUTPATIENT)
Dept: FAMILY MEDICINE | Facility: CLINIC | Age: 67
End: 2022-10-03
Payer: MEDICARE

## 2022-10-04 RX ORDER — PANTOPRAZOLE SODIUM 40 MG/1
40 TABLET, DELAYED RELEASE ORAL DAILY
Qty: 90 TABLET | Refills: 3 | Status: SHIPPED | OUTPATIENT
Start: 2022-10-04 | End: 2023-01-01 | Stop reason: SDUPTHER

## 2022-11-30 ENCOUNTER — PATIENT MESSAGE (OUTPATIENT)
Dept: HEMATOLOGY/ONCOLOGY | Facility: CLINIC | Age: 67
End: 2022-11-30
Payer: MEDICARE

## 2023-01-01 ENCOUNTER — TELEPHONE (OUTPATIENT)
Dept: FAMILY MEDICINE | Facility: CLINIC | Age: 68
End: 2023-01-01
Payer: MEDICARE

## 2023-01-01 ENCOUNTER — PATIENT MESSAGE (OUTPATIENT)
Dept: FAMILY MEDICINE | Facility: CLINIC | Age: 68
End: 2023-01-01
Payer: MEDICARE

## 2023-01-01 ENCOUNTER — OFFICE VISIT (OUTPATIENT)
Dept: FAMILY MEDICINE | Facility: CLINIC | Age: 68
End: 2023-01-01
Payer: MEDICARE

## 2023-01-01 ENCOUNTER — LAB VISIT (OUTPATIENT)
Dept: LAB | Facility: HOSPITAL | Age: 68
End: 2023-01-01
Attending: NURSE PRACTITIONER
Payer: MEDICARE

## 2023-01-01 ENCOUNTER — TELEPHONE (OUTPATIENT)
Dept: HEMATOLOGY/ONCOLOGY | Facility: CLINIC | Age: 68
End: 2023-01-01
Payer: MEDICARE

## 2023-01-01 ENCOUNTER — PATIENT OUTREACH (OUTPATIENT)
Dept: ADMINISTRATIVE | Facility: HOSPITAL | Age: 68
End: 2023-01-01
Payer: MEDICARE

## 2023-01-01 ENCOUNTER — TELEPHONE (OUTPATIENT)
Dept: FAMILY MEDICINE | Facility: CLINIC | Age: 68
End: 2023-01-01

## 2023-01-01 ENCOUNTER — HOSPITAL ENCOUNTER (OUTPATIENT)
Dept: RADIOLOGY | Facility: HOSPITAL | Age: 68
Discharge: HOME | End: 2023-02-10
Attending: INTERNAL MEDICINE
Payer: MEDICARE

## 2023-01-01 ENCOUNTER — HOSPITAL ENCOUNTER (OUTPATIENT)
Dept: RADIOLOGY | Facility: HOSPITAL | Age: 68
Discharge: HOME | End: 2023-06-21
Attending: NURSE PRACTITIONER
Payer: MEDICARE

## 2023-01-01 VITALS
SYSTOLIC BLOOD PRESSURE: 128 MMHG | BODY MASS INDEX: 33.39 KG/M2 | OXYGEN SATURATION: 96 % | WEIGHT: 233.25 LBS | TEMPERATURE: 99 F | HEIGHT: 70 IN | RESPIRATION RATE: 20 BRPM | DIASTOLIC BLOOD PRESSURE: 72 MMHG | HEART RATE: 68 BPM

## 2023-01-01 VITALS
BODY MASS INDEX: 34.2 KG/M2 | WEIGHT: 238.88 LBS | OXYGEN SATURATION: 97 % | HEART RATE: 58 BPM | DIASTOLIC BLOOD PRESSURE: 68 MMHG | TEMPERATURE: 98 F | HEIGHT: 70 IN | RESPIRATION RATE: 18 BRPM | SYSTOLIC BLOOD PRESSURE: 128 MMHG

## 2023-01-01 DIAGNOSIS — H69.90 DYSFUNCTION OF EUSTACHIAN TUBE, UNSPECIFIED LATERALITY: ICD-10-CM

## 2023-01-01 DIAGNOSIS — R94.2 ABNORMAL PET SCAN, LUNG: ICD-10-CM

## 2023-01-01 DIAGNOSIS — F51.02 ADJUSTMENT INSOMNIA: ICD-10-CM

## 2023-01-01 DIAGNOSIS — Z95.5 S/P CORONARY ARTERY STENT PLACEMENT: ICD-10-CM

## 2023-01-01 DIAGNOSIS — E11.9 TYPE 2 DIABETES MELLITUS WITHOUT COMPLICATION, WITHOUT LONG-TERM CURRENT USE OF INSULIN: ICD-10-CM

## 2023-01-01 DIAGNOSIS — H69.90 DYSFUNCTION OF EUSTACHIAN TUBE, UNSPECIFIED LATERALITY: Primary | ICD-10-CM

## 2023-01-01 DIAGNOSIS — I25.10 CORONARY ARTERY DISEASE INVOLVING NATIVE CORONARY ARTERY OF NATIVE HEART WITHOUT ANGINA PECTORIS: ICD-10-CM

## 2023-01-01 DIAGNOSIS — Z13.6 ENCOUNTER FOR ABDOMINAL AORTIC ANEURYSM (AAA) SCREENING: ICD-10-CM

## 2023-01-01 DIAGNOSIS — R79.89 ELEVATED SERUM CREATININE: Primary | ICD-10-CM

## 2023-01-01 DIAGNOSIS — R91.1 LUNG NODULE: ICD-10-CM

## 2023-01-01 DIAGNOSIS — G47.00 INSOMNIA, UNSPECIFIED TYPE: Primary | ICD-10-CM

## 2023-01-01 DIAGNOSIS — E78.5 DYSLIPIDEMIA: ICD-10-CM

## 2023-01-01 DIAGNOSIS — I25.119 ATHEROSCLEROSIS OF NATIVE CORONARY ARTERY OF NATIVE HEART WITH ANGINA PECTORIS: ICD-10-CM

## 2023-01-01 DIAGNOSIS — Z79.899 ENCOUNTER FOR LONG-TERM CURRENT USE OF MEDICATION: ICD-10-CM

## 2023-01-01 LAB
CHOLEST SERPL-MCNC: 152 MG/DL (ref 120–199)
CHOLEST/HDLC SERPL: 2.8 {RATIO} (ref 2–5)
GLUCOSE SERPL-MCNC: 95 MG/DL (ref 70–110)
HDLC SERPL-MCNC: 54 MG/DL (ref 40–75)
HDLC SERPL: 35.5 % (ref 20–50)
LDLC SERPL CALC-MCNC: 79.4 MG/DL (ref 63–159)
NONHDLC SERPL-MCNC: 98 MG/DL
PROSTATE SPECIFIC ANTIGEN, TOTAL: 1.15 NG/ML
TRIGL SERPL-MCNC: 93 MG/DL (ref 30–150)

## 2023-01-01 PROCEDURE — 3074F SYST BP LT 130 MM HG: CPT | Mod: CPTII,S$GLB,, | Performed by: NURSE PRACTITIONER

## 2023-01-01 PROCEDURE — 36415 COLL VENOUS BLD VENIPUNCTURE: CPT | Mod: PO | Performed by: NURSE PRACTITIONER

## 2023-01-01 PROCEDURE — 3078F DIAST BP <80 MM HG: CPT | Mod: CPTII,S$GLB,, | Performed by: NURSE PRACTITIONER

## 2023-01-01 PROCEDURE — 3044F PR MOST RECENT HEMOGLOBIN A1C LEVEL <7.0%: ICD-10-PCS | Mod: CPTII,S$GLB,, | Performed by: NURSE PRACTITIONER

## 2023-01-01 PROCEDURE — 99214 PR OFFICE/OUTPT VISIT, EST, LEVL IV, 30-39 MIN: ICD-10-PCS | Mod: S$GLB,,, | Performed by: NURSE PRACTITIONER

## 2023-01-01 PROCEDURE — 3008F PR BODY MASS INDEX (BMI) DOCUMENTED: ICD-10-PCS | Mod: CPTII,S$GLB,, | Performed by: NURSE PRACTITIONER

## 2023-01-01 PROCEDURE — 76706 US ABDL AORTA SCREEN AAA: CPT | Mod: 26,,, | Performed by: RADIOLOGY

## 2023-01-01 PROCEDURE — 3074F PR MOST RECENT SYSTOLIC BLOOD PRESSURE < 130 MM HG: ICD-10-PCS | Mod: CPTII,S$GLB,, | Performed by: NURSE PRACTITIONER

## 2023-01-01 PROCEDURE — 78815 PET IMAGE W/CT SKULL-THIGH: CPT | Mod: TC,PS,PN

## 2023-01-01 PROCEDURE — 3288F FALL RISK ASSESSMENT DOCD: CPT | Mod: CPTII,S$GLB,, | Performed by: NURSE PRACTITIONER

## 2023-01-01 PROCEDURE — 1126F PR PAIN SEVERITY QUANTIFIED, NO PAIN PRESENT: ICD-10-PCS | Mod: CPTII,S$GLB,, | Performed by: NURSE PRACTITIONER

## 2023-01-01 PROCEDURE — 3061F NEG MICROALBUMINURIA REV: CPT | Mod: CPTII,S$GLB,, | Performed by: NURSE PRACTITIONER

## 2023-01-01 PROCEDURE — 78815 PET IMAGE W/CT SKULL-THIGH: CPT | Mod: 26,PS,, | Performed by: RADIOLOGY

## 2023-01-01 PROCEDURE — 3061F PR NEG MICROALBUMINURIA RESULT DOCUMENTED/REVIEW: ICD-10-PCS | Mod: CPTII,S$GLB,, | Performed by: NURSE PRACTITIONER

## 2023-01-01 PROCEDURE — 1101F PT FALLS ASSESS-DOCD LE1/YR: CPT | Mod: CPTII,S$GLB,, | Performed by: NURSE PRACTITIONER

## 2023-01-01 PROCEDURE — 1159F PR MEDICATION LIST DOCUMENTED IN MEDICAL RECORD: ICD-10-PCS | Mod: CPTII,S$GLB,, | Performed by: NURSE PRACTITIONER

## 2023-01-01 PROCEDURE — 80061 LIPID PANEL: CPT | Performed by: NURSE PRACTITIONER

## 2023-01-01 PROCEDURE — 76706 US AAA SCREENING: ICD-10-PCS | Mod: 26,,, | Performed by: RADIOLOGY

## 2023-01-01 PROCEDURE — 3008F BODY MASS INDEX DOCD: CPT | Mod: CPTII,S$GLB,, | Performed by: NURSE PRACTITIONER

## 2023-01-01 PROCEDURE — 1126F AMNT PAIN NOTED NONE PRSNT: CPT | Mod: CPTII,S$GLB,, | Performed by: NURSE PRACTITIONER

## 2023-01-01 PROCEDURE — 3066F PR DOCUMENTATION OF TREATMENT FOR NEPHROPATHY: ICD-10-PCS | Mod: CPTII,S$GLB,, | Performed by: NURSE PRACTITIONER

## 2023-01-01 PROCEDURE — 1101F PR PT FALLS ASSESS DOC 0-1 FALLS W/OUT INJ PAST YR: ICD-10-PCS | Mod: CPTII,S$GLB,, | Performed by: NURSE PRACTITIONER

## 2023-01-01 PROCEDURE — 99214 OFFICE O/P EST MOD 30 MIN: CPT | Mod: S$GLB,,, | Performed by: NURSE PRACTITIONER

## 2023-01-01 PROCEDURE — 1160F RVW MEDS BY RX/DR IN RCRD: CPT | Mod: CPTII,S$GLB,, | Performed by: NURSE PRACTITIONER

## 2023-01-01 PROCEDURE — A9552 F18 FDG: HCPCS | Mod: PN

## 2023-01-01 PROCEDURE — 3078F PR MOST RECENT DIASTOLIC BLOOD PRESSURE < 80 MM HG: ICD-10-PCS | Mod: CPTII,S$GLB,, | Performed by: NURSE PRACTITIONER

## 2023-01-01 PROCEDURE — 1160F PR REVIEW ALL MEDS BY PRESCRIBER/CLIN PHARMACIST DOCUMENTED: ICD-10-PCS | Mod: CPTII,S$GLB,, | Performed by: NURSE PRACTITIONER

## 2023-01-01 PROCEDURE — 3288F PR FALLS RISK ASSESSMENT DOCUMENTED: ICD-10-PCS | Mod: CPTII,S$GLB,, | Performed by: NURSE PRACTITIONER

## 2023-01-01 PROCEDURE — 3066F NEPHROPATHY DOC TX: CPT | Mod: CPTII,S$GLB,, | Performed by: NURSE PRACTITIONER

## 2023-01-01 PROCEDURE — 1159F MED LIST DOCD IN RCRD: CPT | Mod: CPTII,S$GLB,, | Performed by: NURSE PRACTITIONER

## 2023-01-01 PROCEDURE — 76706 US ABDL AORTA SCREEN AAA: CPT | Mod: TC,PO

## 2023-01-01 PROCEDURE — 78815 NM PET CT ROUTINE: ICD-10-PCS | Mod: 26,PS,, | Performed by: RADIOLOGY

## 2023-01-01 PROCEDURE — 3044F HG A1C LEVEL LT 7.0%: CPT | Mod: CPTII,S$GLB,, | Performed by: NURSE PRACTITIONER

## 2023-01-01 RX ORDER — SEMAGLUTIDE 0.68 MG/ML
0.5 INJECTION, SOLUTION SUBCUTANEOUS WEEKLY
Qty: 3 ML | Refills: 3 | Status: ON HOLD | OUTPATIENT
Start: 2023-01-01 | End: 2023-01-01

## 2023-01-01 RX ORDER — METHYLPREDNISOLONE 4 MG/1
TABLET ORAL
Qty: 21 TABLET | OUTPATIENT
Start: 2023-01-01

## 2023-01-01 RX ORDER — SEMAGLUTIDE 0.68 MG/ML
INJECTION, SOLUTION SUBCUTANEOUS
Qty: 3 ML | Refills: 3 | Status: SHIPPED | OUTPATIENT
Start: 2023-01-01 | End: 2023-01-01 | Stop reason: SDUPTHER

## 2023-01-01 RX ORDER — MECLIZINE HYDROCHLORIDE 25 MG/1
25 TABLET ORAL 3 TIMES DAILY PRN
Qty: 30 TABLET | Refills: 0 | Status: SHIPPED | OUTPATIENT
Start: 2023-01-01 | End: 2023-01-01 | Stop reason: CLARIF

## 2023-01-01 RX ORDER — METHYLPREDNISOLONE 4 MG/1
TABLET ORAL
Qty: 21 TABLET | Refills: 10 | Status: SHIPPED | OUTPATIENT
Start: 2023-01-01 | End: 2023-01-01 | Stop reason: CLARIF

## 2023-01-01 RX ORDER — DICLOFENAC SODIUM 50 MG/1
TABLET, DELAYED RELEASE ORAL
COMMUNITY
Start: 2023-01-01 | End: 2023-01-01 | Stop reason: CLARIF

## 2023-01-01 RX ORDER — PANTOPRAZOLE SODIUM 40 MG/1
40 TABLET, DELAYED RELEASE ORAL DAILY
Qty: 90 TABLET | Refills: 3 | Status: SHIPPED | OUTPATIENT
Start: 2023-01-01 | End: 2024-01-01 | Stop reason: CLARIF

## 2023-01-01 RX ORDER — METHYLPREDNISOLONE 4 MG/1
TABLET ORAL
Qty: 21 EACH | Refills: 0 | Status: SHIPPED | OUTPATIENT
Start: 2023-01-01 | End: 2023-01-01

## 2023-02-06 PROBLEM — R73.03 PREDIABETES: Status: ACTIVE | Noted: 2023-01-01

## 2023-02-06 PROBLEM — I25.2 HISTORY OF ACUTE ANTERIOR WALL MI: Status: RESOLVED | Noted: 2020-03-12 | Resolved: 2023-01-01

## 2023-02-06 PROBLEM — Z87.891 FORMER CIGARETTE SMOKER: Status: ACTIVE | Noted: 2022-09-13

## 2023-02-06 PROBLEM — Z72.0 SMOKELESS TOBACCO USE: Status: ACTIVE | Noted: 2023-01-01

## 2023-02-06 PROBLEM — E66.09 CLASS 1 OBESITY DUE TO EXCESS CALORIES WITH SERIOUS COMORBIDITY AND BODY MASS INDEX (BMI) OF 34.0 TO 34.9 IN ADULT: Status: ACTIVE | Noted: 2023-01-01

## 2023-02-10 NOTE — PROGRESS NOTES
PET Imaging Questionnaire    Are you a Diabetic? Recent Blood Sugar level? Yes    Are you anemic? Bone Marrow Stimulation Meds? No    Have you had a CT Scan, if so when & where was your last one? No    Have you had a PET Scan, if so when & where was your last one? Yes    Chemotherapy or currently on Chemotherapy? No    Radiation therapy? No    Surgical History:   Past Surgical History:   Procedure Laterality Date    CORONARY ANGIOPLASTY WITH STENT PLACEMENT  2015    X1    ENDOBRONCHIAL ULTRASOUND Bilateral 6/23/2022    Procedure: ENDOBRONCHIAL ULTRASOUND (EBUS);  Surgeon: Grzegorz Lerner MD;  Location: [facility];  Service: Pulmonary;  Laterality: Bilateral;  10 L and 11L    EPIDURAL BLOCK INJECTION      lumbar    LAPAROSCOPIC GASTRIC BANDING  2010    SLEEVE GASTROPLASTY  2018        Have you been fasting for at least 6 hours? Yes    Is there any chance you may be pregnant or breastfeeding? No    Assay: 12.1 MCi@:8.24   Injection Site:lt arm     Residual: 1.175 mCi@: 8.26   Technologist: Ness Heaton Injected:10.92mCi

## 2023-02-16 PROBLEM — M47.814 OSTEOARTHRITIS OF THORACIC SPINE: Status: ACTIVE | Noted: 2023-01-01

## 2023-05-22 NOTE — TELEPHONE ENCOUNTER
Pt stated that he has had some issues with his ear since December of 2022. Pt woke up Saturday and was experiencing vertigo. Pt is feeling better today but scheduled and appointment with Desiree for Wednesday.

## 2023-05-22 NOTE — TELEPHONE ENCOUNTER
----- Message from Olga Rob sent at 5/22/2023  9:20 AM CDT -----  Contact: PT at [phone]  Type:  Sooner Appointment Request    Caller is requesting a sooner appointment.  Caller declined first available appointment listed below.  Caller will not accept being placed on the waitlist and is requesting a message be sent to doctor.    Name of Caller:  PT   When is the first available appointment?  N/A  Symptoms:  Woke up with virdigo and ear aches off and on Sturday  Best Call Back Number:  [phone]  Additional Information:  PT need appt today.  Please call back to advise.

## 2023-05-24 NOTE — TELEPHONE ENCOUNTER
Rx was called into pts mail order pharmacy but pt needed rx sent to local pharmacy. We called [facility] pharmacy and gave a verbal per komal so that pt can  rx today. Pt was informed that rx was called into local pharmacy.

## 2023-05-24 NOTE — PROGRESS NOTES
"Subjective:       Patient ID: Tam Koo is a 67 y.o. male.    Chief Complaint: Dizziness and Ear Fullness  The patient presents today with complaints of ear fullness as well as dizziness.  The patient states he is having pain to the right year in dizziness with sudden movements.  This started about 5 days ago.  It is not as bad as it was.  He denies any fever or chills.  No cough or congestion.  HPI  Review of Systems   Constitutional:  Negative for appetite change, chills and fever.   HENT:  Negative for ear pain and postnasal drip.    Eyes:  Negative for pain and itching.   Respiratory:  Negative for chest tightness and shortness of breath.    Gastrointestinal:  Negative for abdominal distention and abdominal pain.   Endocrine: Negative for polydipsia and polyuria.   Genitourinary:  Negative for difficulty urinating and flank pain.   Skin:  Negative for color change and pallor.   Neurological:  Negative for light-headedness and headaches.   Hematological:  Negative for adenopathy. Does not bruise/bleed easily.   Psychiatric/Behavioral:  Negative for agitation.      Past medical, surgical, family and social history reviewed.  Objective:     Vitals:    05/24/23 1255   BP: 128/72   Pulse: 68   Resp: 20   Temp: 98.7 °F (37.1 °C)   SpO2: 96%   Weight: 105.8 kg (233 lb 4 oz)   Height: 5' 10" (1.778 m)   PainSc: 0-No pain     Body mass index is 33.47 kg/m².     Physical Exam  Constitutional:       Appearance: He is well-developed.   HENT:      Head: Normocephalic and atraumatic.      Right Ear: External ear normal. Tympanic membrane has decreased mobility.      Left Ear: External ear normal. Tympanic membrane has decreased mobility.      Nose: Mucosal edema and rhinorrhea present.      Mouth/Throat:      Pharynx: No posterior oropharyngeal erythema.   Eyes:      Conjunctiva/sclera: Conjunctivae normal.      Pupils: Pupils are equal, round, and reactive to light.   Neck:      Trachea: No tracheal deviation. "   Cardiovascular:      Rate and Rhythm: Normal rate and regular rhythm.      Heart sounds: No murmur heard.    No friction rub. No gallop.   Pulmonary:      Effort: Pulmonary effort is normal. No respiratory distress.      Breath sounds: Normal breath sounds. No stridor. No wheezing or rales.   Musculoskeletal:         General: Normal range of motion.      Cervical back: Normal range of motion and neck supple.   Lymphadenopathy:      Cervical: No cervical adenopathy.   Skin:     General: Skin is warm and dry.   Neurological:      Mental Status: He is alert and oriented to person, place, and time.   Psychiatric:         Behavior: Behavior normal.         Thought Content: Thought content normal.         Judgment: Judgment normal.       Assessment:       1. Dysfunction of Eustachian tube, unspecified laterality    2. Encounter for abdominal aortic aneurysm (AAA) screening    3. Encounter for long-term current use of medication    4. Type 2 diabetes mellitus without complication, without long-term current use of insulin    5. Atherosclerosis of native coronary artery of native heart with angina pectoris        Plan:       Tam was seen today for dizziness and ear fullness.    Diagnoses and all orders for this visit:    Dysfunction of Eustachian tube, unspecified laterality  Hopefully vertigo is related to the ear.  He will follow-up with me if dizziness and/or ear pressure persists.  -     methylPREDNISolone (MEDROL DOSEPACK) 4 mg tablet; use as directed    Encounter for abdominal aortic aneurysm (AAA) screening  -      AAA Screening; Future    Encounter for long-term current use of medication  -     CBC Auto Differential; Future  -     Comprehensive Metabolic Panel; Future  -     Hemoglobin A1C; Future  -     Lipid Panel; Future  -     TSH; Future    Type 2 diabetes mellitus without complication, without long-term current use of insulin  Controlled.  Check labs in microalbumin.  -     Microalbumin/Creatinine Ratio,  Urine; Future    CAD/Athersoclerosis  Stable  2015 PCI/STEMI  Continue f/u with cardiology         I spent 30 minutes on this encounter, time includes face-to-face, chart review, documentation, test review and orders.

## 2023-05-24 NOTE — TELEPHONE ENCOUNTER
----- Message from Amber Caraballo sent at 5/24/2023  2:55 PM CDT -----  Who Called: Patient     What is the request in detail: Requesting call back to discuss pt rx methylPREDNISolone (MEDROL DOSEPACK) , pt doesn't want to wait for rx. Pls advise    Can the clinic reply by [facility]? No    Best Call Back Number: [phone]      Additional Information:

## 2023-06-08 NOTE — LETTER
AUTHORIZATION FOR RELEASE OF   CONFIDENTIAL INFORMATION    Julian Rodriguez MD    We are seeing Tam Koo, date of birth 1955, in the clinic at [facility]. Shu Koch NP is the patient's PCP. Tam Koo has an outstanding lab/procedure at the time we reviewed his chart. In order to help keep his health information updated, he has authorized us to request the following medical record(s):       COLONOSCOPY          Please fax records to [facility], Elizabeth T Buras, NP, [phone]     If you have any questions, please contact Trang Sorto, Care Coordinator   at [phone].            Patient Name: Tam Koo  : 1955  Patient Phone #: [phone]

## 2023-06-08 NOTE — LETTER
AUTHORIZATION FOR RELEASE OF   CONFIDENTIAL INFORMATION    Lino Vigil OD    We are seeing Tam Koo, date of birth 1955, in the clinic at [facility]. Shu Koch NP is the patient's PCP. Tam Koo has an outstanding lab/procedure at the time we reviewed his chart. In order to help keep his health information updated, he has authorized us to request the following medical record(s):          EYE EXAM                    Please fax records to [facility], Elizabeth T Buras, NP, [phone]     If you have any questions, please contact Trang Sorto, Care Coordinator   at [phone].            Patient Name: Tam Koo  : 1955  Patient Phone #: [phone]

## 2023-06-08 NOTE — PROGRESS NOTES
Population Health Chart Review & Patient Outreach Details:     Reason for Outreach Encounter:     []  Non-Compliant Report   []  Payor Report ([employer], PHN, BCBS, MSSP, MCIP, UHC, etc.)   []  Pre-Visit Chart Review     Updates Requested / Reviewed:     []  Care Everywhere    []     []  External Sources ([facility], [facility], DIS, etc.)   []  Care Team Updated    Patient Outreach Method:    []  Telephone Outreach Completed   [] Successful   [] Left Voicemail   [] Unable to Contact (wrong number, no voicemail)  []  [facility] Outreach Sent  []  Letter Outreach Mailed  [x]  Fax Sent for External Records  [x]  External Records Upload    Health Maintenance Topics Addressed and Outreach Outcomes / Actions Taken:        []      Breast Cancer Screening []  Mammo Scheduled      []  External Records Requested     []  Added Reminder to Complete to Upcoming Primary Care Appt Notes     []  Patient Declined     []  Patient Will Call Back to Schedule     []  Patient Will Schedule with External Provider / Order Routed if Applicable             []       Cervical Cancer Screening []  Pap Scheduled      []  External Records Requested     []  Added Reminder to Complete to Upcoming Primary Care Appt Notes     []  Patient Declined     []  Patient Will Call Back to Schedule     []  Patient Will Schedule with External Provider               [x]          Colorectal Cancer Screening []  Colonoscopy Case Request or Referral Placed     [x]  External Records Requested     []  Added Reminder to Complete to Upcoming Primary Care Appt Notes     []  Patient Declined     []  Patient Will Call Back to Schedule     []  Patient Will Schedule with External Provider     []  Fit Kit Mailed (add the SmartPhrase under additional notes)     []  Reminded Patient to Complete Home Test             [x]      Diabetic Eye Exam []  Eye Camera Scheduled or Optometry Referral Placed     [x]  External Records Requested     []  Added Reminder to Complete to  Upcoming Primary Care Appt Notes     []  Patient Declined     []  Patient Will Call Back to Schedule     []  Patient Will Schedule with External Provider             []      Blood Pressure Control []  Primary Care Follow Up Visit Scheduled     []  Remote Blood Pressure Reading Captured     []  Added Reminder to Complete to Upcoming Primary Care Appt Notes     []  Patient Declined     []  Patient Will Call Back / Patient Will Send Portal Message with Reading     []  Patient Will Call Back to Schedule Provider Visit             [x]       HbA1c & Other Labs PSA [x]  Lab Appt Scheduled for Due Labs     []  Primary Care Follow Up Visit Scheduled      []  Reminded Patient to Complete Home Test     []  Added Reminder to Complete to Upcoming Primary Care Appt Notes     []  Patient Declined     []  Patient Will Call Back to Schedule     []  Patient Will Schedule with External Provider / Order Routed if Applicable           []    Schedule Primary Care Appt []  Primary Care Appt Scheduled     []  Patient Declined     []  Patient Will Call Back to Schedule     []  Pt Established with External Provider & Updated Care Team             []      Medication Adherence []  Primary Care Appointment Scheduled     []  Added Reminder to Upcoming Primary Care Appt Notes     []  Patient Reminded to  Prescription     []  Patient Declined, Provider Notified if Needed     []  Sent Provider Message to Review and/or Add Exclusion to Problem List             []      Osteoporosis Screening []  DXA Appointment Scheduled     []  External Records Requested     []  Added Reminder to Complete to Upcoming Primary Care Appt Notes     []  Patient Declined     []  Patient Will Call Back to Schedule     []  Patient Will Schedule with External Provider / Order Routed if Applicable     Additional Care Coordinator Notes:         Further Action Needed If Patient Returns Outreach:

## 2023-06-25 NOTE — TELEPHONE ENCOUNTER
Labs were fine overall, his kidney function was slightly diminished, I would like to recheck in one month

## 2023-06-26 NOTE — TELEPHONE ENCOUNTER
----- Message from Eliz Davis sent at 6/26/2023 11:52 AM CDT -----  Contact: Patient  Type:  Patient Returning Call    Who Called:  Patient  Who Left Message for Patient:  BESSY MANE  Does the patient know what this is regarding?:  his test results, he's not sure?  Best Call Back Number:  [phone]  Additional Information:  Please call the pt back to advise. Thanks!

## 2023-08-29 NOTE — PROGRESS NOTES
"Subjective:       Patient ID: Tam Koo is a 67 y.o. male.    Chief Complaint: Annual Exam  the pt presents to establish care. Pt has the following active problems.      1. CAD-PCI/STEMI 9/2015. Pt also has HTN-followed byDr FARHAT-on toprol. On ASA  2. GERD-stable on protonix  3. Chronic constipation-stable on linzess  4. BPH-stable on flomax  5. DM-on metformin        Lab Results   Component Value Date     HGBA1C 5.7 (H) 08/12/2020   6. Dyslipidemia-stable on crestor, zetia.  HPI  Review of Systems   Constitutional:  Negative for activity change, appetite change, chills and fever.   HENT:  Negative for congestion, ear pain, postnasal drip, rhinorrhea and sinus pressure.    Eyes:  Negative for pain, redness and itching.   Respiratory:  Negative for choking, chest tightness and shortness of breath.    Gastrointestinal:  Negative for abdominal distention, abdominal pain, blood in stool, constipation, diarrhea, nausea and vomiting.   Endocrine: Negative for polydipsia, polyphagia and polyuria.   Genitourinary:  Negative for difficulty urinating, dysuria, flank pain and hematuria.   Musculoskeletal:  Negative for arthralgias and myalgias.   Skin:  Negative for color change, pallor and rash.   Neurological:  Negative for dizziness, light-headedness and headaches.   Hematological:  Negative for adenopathy. Does not bruise/bleed easily.   Psychiatric/Behavioral:  Negative for agitation and behavioral problems.        Past medical, surgical, family and social history reviewed.  Objective:     Vitals:    08/29/23 0859   BP: 128/68   Pulse: (!) 58   Resp: 18   Temp: 98.3 °F (36.8 °C)   SpO2: 97%   Weight: 108.3 kg (238 lb 13.9 oz)   Height: 5' 10" (1.778 m)   PainSc: 0-No pain     Body mass index is 34.27 kg/m².     Physical Exam  Constitutional:       General: He is not in acute distress.     Appearance: He is well-developed. He is obese. He is not diaphoretic.   HENT:      Head: Normocephalic and atraumatic.      Right " Ear: Hearing, tympanic membrane, ear canal and external ear normal.      Left Ear: Hearing, tympanic membrane, ear canal and external ear normal.      Nose: Nose normal.      Mouth/Throat:      Pharynx: Uvula midline.   Eyes:      General: No scleral icterus.        Right eye: No discharge.         Left eye: No discharge.      Conjunctiva/sclera: Conjunctivae normal.      Pupils: Pupils are equal, round, and reactive to light.   Neck:      Thyroid: No thyromegaly.      Vascular: No carotid bruit or JVD.      Trachea: Trachea normal. No tracheal deviation.   Cardiovascular:      Rate and Rhythm: Normal rate and regular rhythm.      Pulses:           Dorsalis pedis pulses are 2+ on the right side and 2+ on the left side.        Posterior tibial pulses are 2+ on the right side and 2+ on the left side.      Heart sounds: Normal heart sounds. No murmur heard.     No friction rub. No gallop.   Pulmonary:      Effort: Pulmonary effort is normal. No respiratory distress.      Breath sounds: Normal breath sounds. No stridor. No wheezing or rales.   Chest:      Chest wall: No tenderness.   Abdominal:      General: Bowel sounds are normal. There is no distension.      Palpations: Abdomen is soft. There is no mass.      Tenderness: There is no abdominal tenderness. There is no guarding or rebound.   Musculoskeletal:         General: Normal range of motion.      Cervical back: Normal range of motion and neck supple.   Feet:      Right foot:      Protective Sensation: 5 sites tested.  5 sites sensed.      Skin integrity: No ulcer, blister, skin breakdown, erythema, warmth, callus or dry skin.      Left foot:      Protective Sensation: 5 sites tested.  5 sites sensed.      Skin integrity: No ulcer, blister, skin breakdown, erythema, warmth, callus or dry skin.   Lymphadenopathy:      Cervical: No cervical adenopathy.   Skin:     General: Skin is warm and dry.   Neurological:      Mental Status: He is alert and oriented to person,  place, and time.      Coordination: Coordination normal.   Psychiatric:         Behavior: Behavior normal.         Thought Content: Thought content normal.         Judgment: Judgment normal.         Assessment:       1. Insomnia, unspecified type    2. Adjustment insomnia    3. Type 2 diabetes mellitus without complication, without long-term current use of insulin    4. S/P coronary artery stent placement - LAD 09/2015    5. Dyslipidemia        Plan:       Tam was seen today for annual exam.    Diagnoses and all orders for this visit:    Insomnia, unspecified type  -     Ambulatory referral/consult to Sleep Disorders; Future  -     Home Sleep Study; Future  -     Polysomnogram (CPAP will be added if patient meets diagnostic criteria.); Future  -     Ambulatory referral/consult to Sleep Disorders; Future    Adjustment insomnia  -     Home Sleep Study; Future  -     Polysomnogram (CPAP will be added if patient meets diagnostic criteria.); Future    Type 2 diabetes mellitus without complication, without long-term current use of insulin  Lab Results   Component Value Date    HGBA1C 5.4 06/21/2023         S/P coronary artery stent placement - LAD 09/2015  Stable. Continue current medications.    Dyslipidemia  Stable. Continue current medications.    I spent 30 minutes on this encounter, time includes face-to-face, chart review, documentation, test review and orders.

## 2023-10-04 NOTE — TELEPHONE ENCOUNTER
Refill Routing Note   Medication(s) are not appropriate for processing by [facility] for the following reason(s):      Non-participating provider    [facility] action(s):  Route Care Due:  None identified            Appointments  past 12m or future 3m with PCP    Date Provider   Last Visit   8/29/2023 Shu Koch NP   Next Visit   2/15/2024 Shu Koch NP   ED visits in past 90 days: 0        Note composed:4:09 AM 10/04/2023

## 2023-12-12 NOTE — TELEPHONE ENCOUNTER
Received call from pt.  He stated for about 3 months now he been getting SOB and getting worse.  He is requesting to appt with Dr Lerner; LOV was in Feb 2023, path result neg for cancer and f/u CT for one year.   Informed Dr Lerner will not be in until Jan.   Transferred pt to Dr Lerner's clinic at [facility].  Informed the pt to ask for a visit even if its with another provider or nurse practitioner.  Pt verbalized understanding.

## 2023-12-15 PROBLEM — Z71.89 ACP (ADVANCE CARE PLANNING): Status: ACTIVE | Noted: 2023-01-01

## 2023-12-15 PROBLEM — J90 BILATERAL PLEURAL EFFUSION: Status: ACTIVE | Noted: 2023-01-01

## 2023-12-15 PROBLEM — R79.89 ELEVATED TROPONIN: Status: ACTIVE | Noted: 2023-01-01

## 2023-12-15 PROBLEM — K21.9 GASTROESOPHAGEAL REFLUX DISEASE WITHOUT ESOPHAGITIS: Status: ACTIVE | Noted: 2023-01-01

## 2023-12-26 NOTE — TELEPHONE ENCOUNTER
----- Message from Parmjit Collazo sent at 12/22/2023  2:04 PM CST -----  Type: Needs Medical Advice  Who Called:  pt  Best Call Back Number: [phone]  Additional Information: pt is calling the office to speak with the nurse in regards to being admitted to the hospital and for missing his appt today. Please call back to advise, thanks!

## 2023-12-28 PROBLEM — R59.0 MEDIASTINAL LYMPHADENOPATHY: Status: ACTIVE | Noted: 2023-01-01

## 2023-12-28 PROBLEM — Z01.811 PRE-OP CHEST EXAM: Status: ACTIVE | Noted: 2023-01-01

## 2023-12-28 PROBLEM — R14.0 ABDOMINAL DISTENTION: Status: ACTIVE | Noted: 2023-01-01

## 2023-12-28 PROBLEM — R59.0 HILAR LYMPHADENOPATHY: Status: ACTIVE | Noted: 2023-01-01

## 2023-12-28 PROBLEM — R10.13 EPIGASTRIC PAIN: Status: ACTIVE | Noted: 2023-01-01

## 2023-12-28 PROBLEM — R59.9 ENLARGED LYMPH NODES: Status: ACTIVE | Noted: 2023-01-01

## 2023-12-29 PROBLEM — K85.90 ACUTE PANCREATITIS: Status: ACTIVE | Noted: 2023-01-01

## 2023-12-30 PROBLEM — R59.9 ENLARGED LYMPH NODES: Status: ACTIVE | Noted: 2023-01-01

## 2024-01-01 ENCOUNTER — TELEPHONE (OUTPATIENT)
Dept: HEMATOLOGY/ONCOLOGY | Facility: CLINIC | Age: 69
End: 2024-01-01
Payer: MEDICARE

## 2024-01-01 ENCOUNTER — PATIENT MESSAGE (OUTPATIENT)
Dept: HEMATOLOGY/ONCOLOGY | Facility: CLINIC | Age: 69
End: 2024-01-01
Payer: MEDICARE

## 2024-01-01 ENCOUNTER — TELEPHONE (OUTPATIENT)
Dept: SURGERY | Facility: CLINIC | Age: 69
End: 2024-01-01
Payer: MEDICARE

## 2024-01-01 ENCOUNTER — DOCUMENTATION ONLY (OUTPATIENT)
Dept: HEMATOLOGY/ONCOLOGY | Facility: CLINIC | Age: 69
End: 2024-01-01
Payer: MEDICARE

## 2024-01-01 ENCOUNTER — PATIENT MESSAGE (OUTPATIENT)
Dept: SURGERY | Facility: CLINIC | Age: 69
End: 2024-01-01
Payer: MEDICARE

## 2024-01-01 ENCOUNTER — NURSE TRIAGE (OUTPATIENT)
Dept: ADMINISTRATIVE | Facility: CLINIC | Age: 69
End: 2024-01-01
Payer: MEDICARE

## 2024-01-01 ENCOUNTER — OFFICE VISIT (OUTPATIENT)
Dept: PALLIATIVE MEDICINE | Facility: CLINIC | Age: 69
End: 2024-01-01
Payer: MEDICARE

## 2024-01-01 ENCOUNTER — OFFICE VISIT (OUTPATIENT)
Dept: HEMATOLOGY/ONCOLOGY | Facility: CLINIC | Age: 69
End: 2024-01-01
Payer: MEDICARE

## 2024-01-01 ENCOUNTER — TELEPHONE (OUTPATIENT)
Dept: HEMATOLOGY/ONCOLOGY | Facility: CLINIC | Age: 69
End: 2024-01-01

## 2024-01-01 ENCOUNTER — TELEPHONE (OUTPATIENT)
Dept: SURGERY | Facility: HOSPITAL | Age: 69
End: 2024-01-01
Payer: MEDICARE

## 2024-01-01 VITALS
HEIGHT: 70 IN | BODY MASS INDEX: 32.35 KG/M2 | RESPIRATION RATE: 16 BRPM | OXYGEN SATURATION: 99 % | DIASTOLIC BLOOD PRESSURE: 70 MMHG | SYSTOLIC BLOOD PRESSURE: 102 MMHG | WEIGHT: 226 LBS | HEART RATE: 98 BPM | TEMPERATURE: 97 F

## 2024-01-01 VITALS
OXYGEN SATURATION: 99 % | HEART RATE: 98 BPM | WEIGHT: 226 LBS | TEMPERATURE: 97 F | BODY MASS INDEX: 32.35 KG/M2 | DIASTOLIC BLOOD PRESSURE: 70 MMHG | RESPIRATION RATE: 16 BRPM | HEIGHT: 70 IN | SYSTOLIC BLOOD PRESSURE: 102 MMHG

## 2024-01-01 DIAGNOSIS — J91.0 MALIGNANT PLEURAL EFFUSION: ICD-10-CM

## 2024-01-01 DIAGNOSIS — R23.2 HOT FLASHES: Primary | ICD-10-CM

## 2024-01-01 DIAGNOSIS — C16.0 MALIGNANT NEOPLASM OF CARDIA OF STOMACH: ICD-10-CM

## 2024-01-01 DIAGNOSIS — Z51.5 PALLIATIVE CARE BY SPECIALIST: ICD-10-CM

## 2024-01-01 DIAGNOSIS — Z45.2 ENCOUNTER FOR INSERTION OF VENOUS ACCESS PORT: Primary | ICD-10-CM

## 2024-01-01 DIAGNOSIS — C16.9 ADENOCARCINOMA OF STOMACH: Primary | ICD-10-CM

## 2024-01-01 DIAGNOSIS — Z71.89 ACP (ADVANCE CARE PLANNING): ICD-10-CM

## 2024-01-01 DIAGNOSIS — R06.02 SOB (SHORTNESS OF BREATH): ICD-10-CM

## 2024-01-01 DIAGNOSIS — R06.6 HICCUPS: ICD-10-CM

## 2024-01-01 DIAGNOSIS — C16.0 MALIGNANT NEOPLASM OF CARDIA OF STOMACH: Primary | ICD-10-CM

## 2024-01-01 DIAGNOSIS — R11.2 NAUSEA AND VOMITING, UNSPECIFIED VOMITING TYPE: ICD-10-CM

## 2024-01-01 DIAGNOSIS — K13.79 MOUTH SORES: ICD-10-CM

## 2024-01-01 DIAGNOSIS — C16.9 ADENOCARCINOMA OF STOMACH: ICD-10-CM

## 2024-01-01 PROCEDURE — 3066F NEPHROPATHY DOC TX: CPT | Mod: CPTII,S$GLB,, | Performed by: NURSE PRACTITIONER

## 2024-01-01 PROCEDURE — 99999 PR PBB SHADOW E&M-EST. PATIENT-LVL V: CPT | Mod: PBBFAC,,, | Performed by: NURSE PRACTITIONER

## 2024-01-01 PROCEDURE — 3061F NEG MICROALBUMINURIA REV: CPT | Mod: CPTII,S$GLB,, | Performed by: NURSE PRACTITIONER

## 2024-01-01 PROCEDURE — 1125F AMNT PAIN NOTED PAIN PRSNT: CPT | Mod: CPTII,S$GLB,, | Performed by: STUDENT IN AN ORGANIZED HEALTH CARE EDUCATION/TRAINING PROGRAM

## 2024-01-01 PROCEDURE — 3078F DIAST BP <80 MM HG: CPT | Mod: CPTII,S$GLB,, | Performed by: NURSE PRACTITIONER

## 2024-01-01 PROCEDURE — 3288F FALL RISK ASSESSMENT DOCD: CPT | Mod: CPTII,S$GLB,, | Performed by: NURSE PRACTITIONER

## 2024-01-01 PROCEDURE — 3074F SYST BP LT 130 MM HG: CPT | Mod: CPTII,S$GLB,, | Performed by: NURSE PRACTITIONER

## 2024-01-01 PROCEDURE — 1125F AMNT PAIN NOTED PAIN PRSNT: CPT | Mod: CPTII,S$GLB,, | Performed by: NURSE PRACTITIONER

## 2024-01-01 PROCEDURE — 3078F DIAST BP <80 MM HG: CPT | Mod: CPTII,S$GLB,, | Performed by: STUDENT IN AN ORGANIZED HEALTH CARE EDUCATION/TRAINING PROGRAM

## 2024-01-01 PROCEDURE — 99215 OFFICE O/P EST HI 40 MIN: CPT | Mod: S$GLB,,, | Performed by: STUDENT IN AN ORGANIZED HEALTH CARE EDUCATION/TRAINING PROGRAM

## 2024-01-01 PROCEDURE — 3074F SYST BP LT 130 MM HG: CPT | Mod: CPTII,S$GLB,, | Performed by: STUDENT IN AN ORGANIZED HEALTH CARE EDUCATION/TRAINING PROGRAM

## 2024-01-01 PROCEDURE — 3008F BODY MASS INDEX DOCD: CPT | Mod: CPTII,S$GLB,, | Performed by: STUDENT IN AN ORGANIZED HEALTH CARE EDUCATION/TRAINING PROGRAM

## 2024-01-01 PROCEDURE — 99205 OFFICE O/P NEW HI 60 MIN: CPT | Mod: S$GLB,,, | Performed by: NURSE PRACTITIONER

## 2024-01-01 PROCEDURE — 99999 PR PBB SHADOW E&M-EST. PATIENT-LVL III: CPT | Mod: PBBFAC,,, | Performed by: STUDENT IN AN ORGANIZED HEALTH CARE EDUCATION/TRAINING PROGRAM

## 2024-01-01 PROCEDURE — 3008F BODY MASS INDEX DOCD: CPT | Mod: CPTII,S$GLB,, | Performed by: NURSE PRACTITIONER

## 2024-01-01 PROCEDURE — 1101F PT FALLS ASSESS-DOCD LE1/YR: CPT | Mod: CPTII,S$GLB,, | Performed by: NURSE PRACTITIONER

## 2024-01-01 RX ORDER — LIDOCAINE HYDROCHLORIDE 20 MG/ML
SOLUTION OROPHARYNGEAL EVERY 6 HOURS
Qty: 20 ML | Refills: 0 | Status: SHIPPED | OUTPATIENT
Start: 2024-01-01 | End: 2024-02-09

## 2024-01-01 RX ORDER — METOCLOPRAMIDE 10 MG/1
10 TABLET ORAL EVERY 6 HOURS PRN
Qty: 120 TABLET | Refills: 0 | Status: SHIPPED | OUTPATIENT
Start: 2024-01-01 | End: 2024-02-09

## 2024-01-01 RX ORDER — SODIUM CHLORIDE 9 MG/ML
INJECTION, SOLUTION INTRAVENOUS CONTINUOUS
Status: CANCELLED | OUTPATIENT
Start: 2024-01-01

## 2024-01-01 RX ORDER — CLINDAMYCIN PHOSPHATE 900 MG/50ML
900 INJECTION, SOLUTION INTRAVENOUS
Status: CANCELLED | OUTPATIENT
Start: 2024-01-01

## 2024-01-01 RX ORDER — GABAPENTIN 300 MG/1
300 CAPSULE ORAL NIGHTLY
Qty: 30 CAPSULE | Refills: 11 | Status: SHIPPED | OUTPATIENT
Start: 2024-01-01 | End: 2025-01-09

## 2024-01-01 RX ORDER — LACTULOSE 10 G/15ML
SOLUTION ORAL; RECTAL
COMMUNITY
Start: 2024-01-01 | End: 2024-01-01 | Stop reason: SDUPTHER

## 2024-01-01 RX ORDER — BACLOFEN 5 MG/1
10 TABLET ORAL 3 TIMES DAILY PRN
Qty: 90 TABLET | Refills: 0 | Status: ON HOLD | OUTPATIENT
Start: 2024-01-01 | End: 2024-01-01 | Stop reason: HOSPADM

## 2024-01-01 NOTE — TELEPHONE ENCOUNTER
EGD in am with Dr. Rodriguez. I have had fluid drained 4 times. Been 2 days,. Fluid building up and needs to be drained again. I hate to go to the ED and wait, is there a way to get it done without doing that. Advised I would need to triage and the clinics were closed today. Triage done- dispo ED. Stressed to patient need to go as he was audible SOB. States he spouse will drive him. No further questions or concerns at this time.   Reason for Disposition   [1] MODERATE difficulty breathing (e.g., speaks in phrases, SOB even at rest, pulse 100-120) AND [2] NEW-onset or WORSE than normal    Additional Information   Negative: SEVERE difficulty breathing (e.g., struggling for each breath, speaks in single words)   Negative: [1] Breathing stopped AND [2] hasn't returned   Negative: Choking on something   Negative: Bluish (or gray) lips or face now   Negative: Difficult to awaken or acting confused (e.g., disoriented, slurred speech)   Negative: Passed out (i.e., lost consciousness, collapsed and was not responding)   Negative: Wheezing started suddenly after medicine, an allergic food or bee sting   Negative: Stridor (harsh sound while breathing in)   Negative: Slow, shallow and weak breathing   Negative: Sounds like a life-threatening emergency to the triager    Protocols used: Breathing Difficulty-A-AH

## 2024-01-04 PROBLEM — D72.829 LEUKOCYTOSIS: Status: ACTIVE | Noted: 2024-01-01

## 2024-01-04 PROBLEM — J91.0 MALIGNANT PLEURAL EFFUSION: Status: ACTIVE | Noted: 2023-01-01

## 2024-01-04 PROBLEM — C16.0 MALIGNANT NEOPLASM OF CARDIA OF STOMACH: Status: ACTIVE | Noted: 2024-01-01

## 2024-01-04 PROBLEM — C77.2 METASTATIC CANCER TO INTRA-ABDOMINAL LYMPH NODES: Status: ACTIVE | Noted: 2024-01-01

## 2024-01-04 PROBLEM — R11.2 NAUSEA & VOMITING: Status: ACTIVE | Noted: 2024-01-01

## 2024-01-04 PROBLEM — K59.00 CONSTIPATION: Status: ACTIVE | Noted: 2024-01-01

## 2024-01-04 PROBLEM — D64.9 NORMOCYTIC ANEMIA: Status: ACTIVE | Noted: 2024-01-01

## 2024-01-04 PROBLEM — N40.0 BPH (BENIGN PROSTATIC HYPERPLASIA): Status: ACTIVE | Noted: 2024-01-01

## 2024-01-04 PROBLEM — R79.89 ABNORMAL LIVER FUNCTION TESTS: Status: ACTIVE | Noted: 2024-01-01

## 2024-01-04 PROBLEM — R74.8 ELEVATED ALKALINE PHOSPHATASE LEVEL: Status: ACTIVE | Noted: 2024-01-01

## 2024-01-04 PROBLEM — N17.9 AKI (ACUTE KIDNEY INJURY): Status: ACTIVE | Noted: 2024-01-01

## 2024-01-08 NOTE — TELEPHONE ENCOUNTER
----- Message from Arturo Wang sent at 1/8/2024 10:44 AM CST -----  Contact: Spouse/Shu  Type:  Sooner Appointment Request    Caller is requesting a sooner appointment.  Caller declined first available appointment listed below.  Caller will not accept being placed on the waitlist and is requesting a message be sent to doctor.    Name of Caller:  Spouse/Shu  When is the first available appointment?  N/a  Symptoms:  HFU, r/s  Would the patient rather a call back or a response via [employer]? Call  Best Call Back Number:  [phone]  Additional Information:  States they saw Dr Ramires in hosp. Called to [facility] with her

## 2024-01-08 NOTE — NURSING
Spoke with pt's wife, they would like a sooner appointment, I explained that 1/23 is the first available appointment with any provider. Wife agreed to time/date/location. If something comes available I assured her I would let her know.

## 2024-01-09 NOTE — PROGRESS NOTES
[facility] - NEW PATIENT VISIT    Reason for visit: Follow Up     Best Contact Phone Number(s): [phone] (home)      Oncology History   Malignant neoplasm of cardia of stomach   1/4/2024 Initial Diagnosis    Malignant neoplasm of cardia of stomach - Patient was hospitalized 12/2023 with bilateral pleural effusions seen on chest imaging, and was discharged after symptoms improved with bilateral thoracentesis. Pathology at that time showed few atypical cells suspicious for malignancy. 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     12/28/23: CT Abd/Pelvis shows edematous pancreas compatible with acute pancreatitis, numerous enlarged mediastinal, retroperitoneal and mesenteric lymph nodes, gallbladder wall thickening, diffuse mesenteric edema, circumferential wall thickening of bladder  1/3/24: Stomach antrum biopsy shows gastric antral gastropathy, proximal stomach biopsy shows invasive poorly differentiated adenocarcinoma, poorly cohesive / diffuse type. HER 2 1+        HPI: Tam Koo is a 68 y.o. male with metastatic gastric cancer who presents to [facility] care. Patient first noticed weakness and dyspnea several months prior to presenting in ER with bilateral pleural effusions. Since this time he has had repeated thoracentesis showing malignant pleural effusions. Today he reports weakness and dyspnea on exertion, he is able to walk a few steps but is primarily sitting down. He also notes dysphagia to solids and liquids. No stomach pains, blood in stool. He recently had bilateral PleurX catheters placed with mild alleviation of symptoms. He had high output of fluid but notes recently unable to drain right catheter.    Pleural effusion drain hx per wife:  900 (R) 400 (L)  900 (L) 800 (R)   900 (R) 400 (L)  1200 (L) 1100 (R)  1500 (L) unable to drain (R)    Patient presents with wife, Frannie and daughter Jeanna.  ECOG PS is  3.  History has been obtained by chart review and discussion with the patient.    ROS:   A complete 12-point review of systems was reviewed and is negative except as mentioned above.     Past Medical History:   Past Medical History:   Diagnosis Date    Anticoagulant long-term use     Asthma     Cancer     skin, left arm    Cardiomyopathy, ischemic 10/14/2015    Resolved EF now 55%    Coronary artery disease     Mary Wang    Diabetes mellitus, type 2     History of acute anterior wall MI 03/12/2015 2015    Hyperlipidemia     Hypertension     Kidney stone         Past Surgical History:   Past Surgical History:   Procedure Laterality Date    CORONARY ANGIOPLASTY WITH STENT PLACEMENT  2015    X1    ENDOBRONCHIAL ULTRASOUND Bilateral 06/23/2022    Procedure: ENDOBRONCHIAL ULTRASOUND (EBUS);  Surgeon: Grzegorz Lerner MD;  Location: [facility];  Service: Pulmonary;  Laterality: Bilateral;  10 L and 11L    ENDOSCOPIC ULTRASOUND OF UPPER GASTROINTESTINAL TRACT Left 1/2/2024    Procedure: ULTRASOUND, UPPER GI TRACT, ENDOSCOPIC;  Surgeon: Julian Rodriguez MD;  Location: [facility];  Service: Endoscopy;  Laterality: Left;  EUS was not performed due to presence of food in the stomach.    ENDOSCOPIC ULTRASOUND OF UPPER GASTROINTESTINAL TRACT Left 1/3/2024    Procedure: ULTRASOUND, UPPER GI TRACT, ENDOSCOPIC;  Surgeon: Julian Rodriguez MD;  Location: [facility];  Service: Endoscopy;  Laterality: Left;  10 mg Reglan 1 hour prior procedure    EPIDURAL BLOCK INJECTION      lumbar    ESOPHAGOGASTRODUODENOSCOPY N/A 1/2/2024    Procedure: EGD (ESOPHAGOGASTRODUODENOSCOPY);  Surgeon: Julian Rodriguez MD;  Location: [facility];  Service: Endoscopy;  Laterality: N/A;    ESOPHAGOGASTRODUODENOSCOPY N/A 1/3/2024    Procedure: EGD (ESOPHAGOGASTRODUODENOSCOPY);  Surgeon: Julian Rodriguez MD;  Location: [facility];  Service: Endoscopy;  Laterality: N/A;    KIDNEY SURGERY Left     when patient was [age], kidney on  the left only functions at 20% patient states    LAPAROSCOPIC GASTRIC BANDING      SLEEVE GASTROPLASTY  2018        Family History:   Family History   Problem Relation Age of Onset    Cancer Mother     Diabetes Mother     COPD Father     Heart disease Father     Asthma Sister         Social History:   Social History     Tobacco Use    Smoking status: Former     Current packs/day: 0.00     Types: Cigarettes     Quit date:      Years since quittin.0    Smokeless tobacco: Current     Types: Snuff   Substance Use Topics    Alcohol use: Yes     Alcohol/week: 10.0 standard drinks of alcohol     Types: 10 Cans of beer per week        I have reviewed and updated the patient's past medical, surgical, family and social histories.    Allergies:   Review of patient's allergies indicates:   Allergen Reactions    Ether      Pt reports stop breathing     Penicillins      Unknown reaction         Medications:   Current Outpatient Medications   Medication Sig Dispense Refill    albuterol (PROAIR HFA) 90 mcg/actuation inhaler Inhale 2 puffs into the lungs every 6 (six) hours as needed for Wheezing. Rescue 18 g 1    amitriptyline (ELAVIL) 50 MG tablet Take 50 mg by mouth every evening.      aspirin (ECOTRIN) 81 MG EC tablet Take 1 tablet (81 mg total) by mouth every evening. HOLD MEDICINE UNTIL 1 day after Pleurex catheter on Left side placed after DC then ok to resume  0    baclofen (LIORESAL) 5 mg Tab tablet Take 2 tablets (10 mg total) by mouth 3 (three) times daily as needed (HICCUPS). 90 tablet 0    diphenhydrAMINE-aluminum-magnesium hydroxide-simethicone-LIDOcaine viscous HCl 2% Swish and spit 10 mLs every 4 (four) hours as needed (mouth sores). 1 each 0    ezetimibe (ZETIA) 10 mg tablet Take 1 tablet (10 mg total) by mouth once daily. (Patient taking differently: Take 10 mg by mouth every evening.) 90 tablet 4    gabapentin (NEURONTIN) 300 MG capsule Take 1 capsule (300 mg total) by mouth every  evening. 30 capsule 11    glucosamine-chondroitin 500-400 mg tablet Take 1 tablet by mouth every evening. **hold until after placement of left side pleurx      lactulose (CHRONULAC) 10 gram/15 mL solution SMARTSIG:Milliliter(s) By Mouth      lactulose (CHRONULAC) 20 gram/30 mL Soln Take 30 mLs (20 g total) by mouth 2 (two) times daily as needed (constipation). 600 mL 0    LIDOcaine viscous HCl 2% (LIDOCAINE VISCOUS) Soln by Mucous Membrane route every 6 (six) hours. 20 mL 0    linaCLOtide (LINZESS) 145 mcg Cap capsule Take 145 mcg by mouth daily as needed (constipation). **hold until after placement of left side pleurx      metoclopramide HCl (REGLAN) 10 MG tablet Take 1 tablet (10 mg total) by mouth every 6 (six) hours as needed (nausea). 120 tablet 0    metoprolol succinate (TOPROL-XL) 25 MG 24 hr tablet TAKE 1 TABLET (25 MG TOTAL) EVERY EVENING. (Patient taking differently: Take 25 mg by mouth every evening.) 90 tablet 3    multivit-min/FA/lycopen/lutein (CENTRUM SILVER ULTRA MEN'S ORAL) Take 1 tablet by mouth every evening. **hold until after placement of left side pleurx      nitroGLYCERIN (NITROSTAT) 0.4 MG SL tablet Place 1 tablet (0.4 mg total) under the tongue every 5 (five) minutes as needed for Chest pain. 25 tablet 4    ondansetron (ZOFRAN) 4 MG tablet Take 1 tablet (4 mg total) by mouth every 4 (four) hours as needed for Nausea. 30 tablet 0    oxyCODONE-acetaminophen (PERCOCET)  mg per tablet Take 1 tablet by mouth every 4 (four) hours as needed (moderate pain). 84 tablet 0    pantoprazole (PROTONIX) 40 MG tablet Take 1 tablet (40 mg total) by mouth once daily. (Patient taking differently: Take 40 mg by mouth every evening.) 90 tablet 3    rosuvastatin (CRESTOR) 5 MG tablet TAKE 1 TABLET (5 MG TOTAL) EVERY EVENING. (Patient taking differently: Take 5 mg by mouth every evening.) 90 tablet 3    senna-docusate 8.6-50 mg (PERICOLACE) 8.6-50 mg per tablet Take 2 tablets by mouth every  "evening. Hold for loose stools 60 tablet 0    sucralfate (CARAFATE) 1 gram tablet Take 1 tablet (1 g total) by mouth 3 (three) times daily. 90 tablet 0    tamsulosin (FLOMAX) 0.4 mg Cp24 Take 0.4 mg by mouth every evening.      zolpidem (AMBIEN) 10 mg Tab Take 10 mg by mouth every evening.       No current facility-administered medications for this visit.     Facility-Administered Medications Ordered in Other Visits   Medication Dose Route Frequency Provider Last Rate Last Admin    dextrose 50% injection 12.5 g  12.5 g Intravenous PRN Volpi-Abadie, Jacqueline, MD        dextrose 50% injection 25 g  25 g Intravenous PRN Volpi-Abadie, Jacqueline, MD        insulin regular injection 0-8 Units  0-8 Units Intravenous PRN Volpi-Abadie, Jacqueline, MD            Physical Exam:   /70   Pulse 98   Temp 96.9 °F (36.1 °C) (Temporal)   Resp 16   Ht 5' 10" (1.778 m)   Wt 102.5 kg (225 lb 15.5 oz)   SpO2 99%   BMI 32.42 kg/m²                Physical Exam  Constitutional:       Appearance: Normal appearance.   HENT:      Head: Normocephalic and atraumatic.      Mouth/Throat:      Mouth: Mucous membranes are moist.   Eyes:      Extraocular Movements: Extraocular movements intact.      Pupils: Pupils are equal, round, and reactive to light.   Cardiovascular:      Rate and Rhythm: Normal rate and regular rhythm.      Pulses: Normal pulses.      Heart sounds: No murmur heard.  Pulmonary:      Effort: Pulmonary effort is normal. No respiratory distress.      Breath sounds: Normal breath sounds.      Comments: Decreased breath sounds bilateral bases   Abdominal:      General: Abdomen is flat. There is no distension.      Palpations: Abdomen is soft.      Tenderness: There is no abdominal tenderness.   Musculoskeletal:         General: No swelling or tenderness. Normal range of motion.      Cervical back: Normal range of motion. No rigidity.   Skin:     General: Skin is warm and dry.      Coloration: Skin is not " jaundiced.   Neurological:      General: No focal deficit present.      Mental Status: He is alert and oriented to person, place, and time.   Psychiatric:         Mood and Affect: Mood normal.         Behavior: Behavior normal.         Labs:   Lab Results   Component Value Date    WBC 12.92 (H) 01/05/2024    HGB 11.9 (L) 01/05/2024    HCT 37.2 (L) 01/05/2024    MCV 85 01/05/2024     01/05/2024       Lab Results   Component Value Date     (L) 01/05/2024    K 4.4 01/05/2024     01/05/2024    CO2 22 01/05/2024    BUN 48 (H) 01/05/2024    CREATININE 1.79 (H) 01/05/2024    ALBUMIN 2.6 (L) 01/05/2024    BILITOT 0.6 01/05/2024    ALKPHOS 368 (H) 01/05/2024    AST 56 01/05/2024    ALT 29 01/05/2024       Imaging:   CT Pleural Cath Placement Tunneled Left, w/Imaging (xpd)  Narrative: EXAMINATION:  CT PLEURAL CATH PLACEMENT TUNNELED LEFT, W/IMAGING (XPD)    CPT code: 80740, 93071, 07616    CLINICAL HISTORY:  LEFT sided Pleurex Cath placement for malignant pleural effusion;LEFT sided Pleurex Cath placement for malignant pleural effusion; refractory pleural effusion    TECHNIQUE:  Informed consent was obtained from the patient. The patient was placed in a supine position on the CT gantry. The skin of the left lateral chest wall was prepped and draped into a sterile field. 1% lidocaine was used for subcutaneous anesthesia. Under CT guidance, an 18 gauge, 5 Hebrew sheath needle was advanced into the left chest. Aspiration of 10 cc of serosanguineous fluid was noted.    Next, a subcutaneous tract was created over the left lateral chest wall and a 10 Hebrew tunneled pleural catheter was brought about through the subcutaneous tract and then introduced into the left chest using peel-away sheath.    Thoracentesis was then performed removing 1200 cc of serosanguineous pleural fluid.    The catheter was affixed to the skin using Vicryl suture, Dermabond, and silk suture. Sterile dressings were applied. There were no  complications.    Intravenous conscious sedation was provided.    Sedation time: 30 minutes.    Versed and fentanyl administered.  Impression: 1. Successful CT-guided left chest tunneled pleural catheter placement.    Electronically signed by: Lino Parekh MD  Date:    01/09/2024  Time:    14:02            Path:   1/3/24 Gastric Biopsy 1. STOMACH, ANTRUM, BIOPSY:   - GASTRIC ANTRAL MUCOSA WITH REACTIVE GASTROPATHY.   - NEGATIVE FOR HELICOBACTER PYLORI TYPE ORGANISMS.     2. STOMACH, PROXIMAL, BIOPSY:   - INVASIVE POORLY DIFFERENTIATED ADENOCARCINOMA, POORLY COHESIVE/DIFFUSE    TYPE.     HER2 1+  No PDL1 on tissue  Tempus in process       Assessment:       1. SOB (shortness of breath)    2. Malignant pleural effusion          Plan:             # Metastatic Gastric Cancer - Patient presented with bilateral malignant pleural effusions 12/2023, underwent EGD to diagnose gastric cancer 1/3/24. Since this time patient has had progressive weakness and dyspnea on exertion, he has undergone bilateral PleurX catheter drainage due to high volume effusions. He also has dysphagia to solids and liquids limiting po intake.    Today I discussed systemic therapy options for metastatic gastric cancer with palliative intent. Our goals would be to prolong patient's survival and improve QoL with disease control. I would offer patient systemic chemotherapy despite performance status since his function is limited exclusively to his gastric malignancy. Pending MSI status on Tempus testing he may be a candidate for single agent immunotherapy, however this may not work rapidly enough to ensure disease control. Patient may benefit from GI intervention for dysphagia as well, either stenting or G tube.     Will place treatment plan for FOLFOX + Nivolumab per Checkmate 654, will consider monotherapy immunotherapy pending PDL/MSI status and clinical status. I explained that patient would need chemotherapy port for fluorouracil which he is  amenable to. PGX prior to next clinic visit. Supportive medications signed but not yet released.     # Malignant pleural effusions - Management per Dr. Lerner, bilateral PleurX catheters. Dr. Glover to add PDL1 stain to pleural fluid    # Dysphagia - After discussion with Dr. Rodriguez there is no evidence of gastric obstruction, but likely symptoms due to proximal mass. He would therefore not benefit from a stent placement but would be a candidate for G tube in distal portion of stomach. Will refer to dr. Rodriguez for procedure if patient wishes to proceed. Poor po intake and dysphagia due to gastric mass, palliative care today for Zofran and magic mouthwash per family request.     # Advanced Care Planning - Established with Bernie Horowitz today in clinic with supportive medications on board. We are in agreement patient would be a candidate to trial chemotherapy but this is based upon his wishes. Appreciate palliative assistance in [facility] moving forward. Patient is cautious to proceed with systemic therapy so as to not be a burden to his family, although they are very encouraging with him proceeding. In addition, he say his father in law have severe toxicity to radiation therapy to lung cancer which is impairing his desire to proceed.     Follow up: 2w     The above information has been reviewed with the patient and all questions have been answered to their apparent satisfaction.  They understand that they can call the clinic with any questions.    Caron To MD  Hematology/Oncology  [facility]      Med Onc Chart Routing      Follow up with physician 2 weeks. Yousuf 2w   Follow up with ALIYAH    Infusion scheduling note    Injection scheduling note    Labs CBC, CMP and other   Scheduling:  Preferred lab:  Lab interval:  cbc, cmp, pgx prior to clinic visit   Imaging    Pharmacy appointment    Other referrals

## 2024-01-09 NOTE — NURSING
Spoke with Pt's daughter Jeanna, informed of an open spot on Dr. To schedule for tomorrow 1/10 @ 2:30, Pt's wife and daughter agreed to the date/time/location.  Happy to have a sooner appointment.

## 2024-01-09 NOTE — NURSING
Spoke with pt's wife call was disconnected twice, she stated they were at the hospital.  Jeanna ramon Hebron called to set up an appointment with the patient, the first available appointment for 1/23 is set up and I spoke with the pt's wife Ms. Kathleen on 1/8.

## 2024-01-10 NOTE — TELEPHONE ENCOUNTER
[facility] for the lidocaine was for the magic mouthwash Rx the printed instead of send electronically.     ----- Message from Vicenta Espinosa sent at 1/10/2024  3:47 PM CST -----    ----- Message -----  From: Chaya Hamilton  Sent: 1/10/2024   3:38 PM CST  To: [facility] Staff    Type: Need Medical Advice   Who Called:[facility]  Best callback number:   Additional Information: Need instruction on how much the patient should take and will the dispense amount be enough  Please call to further assist, Thanks.

## 2024-01-10 NOTE — PROGRESS NOTES
Office Visit  [facility]      Consult Requested By: Rupal Aguayo  Reason for Consult: symptom management      ASSESSMENT/PLAN:     Plan/Recommendations:  Diagnoses and all orders for this visit:    Hot flashes  -     gabapentin (NEURONTIN) 300 MG capsule; Take 1 capsule (300 mg total) by mouth every evening.    Malignant neoplasm of cardia of stomach  Malignant pleural effusion  -     Patient meeting with Dr. To today to discuss treatment plan    Hiccups  -     baclofen (LIORESAL) 5 mg Tab tablet; Take 2 tablets (10 mg total) by mouth 3 (three) times daily as needed (HICCUPS).    Nausea and vomiting, unspecified vomiting type  -     metoclopramide HCl (REGLAN) 10 MG tablet; Take 1 tablet (10 mg total) by mouth every 6 (six) hours as needed (nausea).    Mouth sores  -     DiphenhydrAMINE-aluminum-magnesium hydroxide-simethicone-LIDOcaine viscous HCl 2%; Swish and spit 10 mLs every 4 (four) hours as needed (mouth sores).    -     LIDOcaine viscous HCl 2% (LIDOCAINE VISCOUS) Soln; by Mucous Membrane route every 6 (six) hours.    Palliative care by specialist  Introduced the role of Palliative Care to provide support for patients with serious illness  Patient with generalized weakness- will order wheelchair for home use  Well supported by wife and children  Provided with the following handouts:  -Tips for better sleep  - Tips to manage SOB  -Tips to manage constipation    ECOG 2  pHQ9 score minimal    ACP (advance care planning)  Living Will and HCPOA documents provided for review\      Understanding of illness: cancer is treatable    Prognosis: fair    Goals of care: symptom management    Follow up: 1 month        SUBJECTIVE:     History of Present Illness:  Patient is a 68 y.o. year old male  with newly diagnosed metastatic gastric cancer , presents accompanied by wife and daughter to establish care. Referred for pain and symptom management     Oncology notes;     Metastatic Gastric Cancer -  Patient presented with bilateral malignant pleural effusions 12/2023, underwent EGD to diagnose gastric cancer 1/3/24. Since this time patient has had progressive weakness and dyspnea on exertion, he has undergone bilateral PleurX catheter drainage due to high volume effusions. He also has dysphagia to solids and liquids limiting po intake.     Today I discussed systemic therapy options for metastatic gastric cancer with palliative intent. Our goals would be to prolong patient's survival and improve QoL with disease control. I would offer patient systemic chemotherapy despite performance status since his function is limited exclusively to his gastric malignancy. Pending MSI status on Tempus testing he may be a candidate for single agent immunotherapy, however this may not work rapidly enough to ensure disease control. Patient may benefit from GI intervention for dysphagia as well, either stenting or G tube.      Will place treatment plan for FOLFOX + Nivolumab per Checkmate 654, will consider monotherapy immunotherapy pending PDL/MSI status and clinical status. I explained that patient would need chemotherapy port for fluorouracil which he is amenable to. PGX prior to next clinic visit. Supportive medications signed but not yet released.         Palliative Care Discussion  New diagnosis discussed. He understands his cancer is not curable.  Currently he has c/o weakness and dyspnea on exertion, he is able to walk a few steps but is primarily sitting down. He has lost about 15 lbs in the last month. He recently had bilateral PleurX catheters placed with mild alleviation of symptoms. He had high output of fluid but notes recently unable to drain right catheter.   He wants to try treatment but does not want a worst quality of life. He will see Oncology after this visit to discuss plan of care.   Discussed ACP, he has no documents, he does acknowledge the importance. Living Will and HCPOA provided for review.     Overall  Assessment of Bodily functions     Pain    Feels pressure on top of head -  Takes percocet- makes him have hot flash- stopped taking   Helped mostly with sleep. Will try gabapentin     Appetite  Poor appetite- 1 meal per day, nausea and vomiting makes it worst- zofran every 4 hours worked x 2 days  He also has mouth sores.   Patient may benefit from appetite stimulant but he wishes to work on improving nausea first.    Sleep  - chronic insomnia  Takes ambien and amitriptyline-     Mood  -as expected, does have adjustments with mood, with current oncology diagnosis and treatment     Bowel Movement  -  Constipation- lactulose  Melcher-[place] two pills in evening      Urination  -able to have normal urination    ROS:  Review of Systems   Constitutional:  Positive for appetite change and fatigue.   Respiratory:  Positive for shortness of breath.    Neurological:  Positive for weakness.       Past Medical History:   Diagnosis Date    Anticoagulant long-term use     Asthma     Cancer     skin, left arm    Cardiomyopathy, ischemic 10/14/2015    Resolved EF now 55%    Coronary artery disease     Mary Wang    Diabetes mellitus, type 2     History of acute anterior wall MI 03/12/2015 2015    Hyperlipidemia     Hypertension     Kidney stone      Past Surgical History:   Procedure Laterality Date    CORONARY ANGIOPLASTY WITH STENT PLACEMENT  2015    X1    ENDOBRONCHIAL ULTRASOUND Bilateral 06/23/2022    Procedure: ENDOBRONCHIAL ULTRASOUND (EBUS);  Surgeon: Grzegorz Lerner MD;  Location: [facility];  Service: Pulmonary;  Laterality: Bilateral;  10 L and 11L    ENDOSCOPIC ULTRASOUND OF UPPER GASTROINTESTINAL TRACT Left 1/2/2024    Procedure: ULTRASOUND, UPPER GI TRACT, ENDOSCOPIC;  Surgeon: Julian Rodriguez MD;  Location: [facility];  Service: Endoscopy;  Laterality: Left;  EUS was not performed due to presence of food in the stomach.    ENDOSCOPIC ULTRASOUND OF UPPER GASTROINTESTINAL TRACT Left 1/3/2024    Procedure: ULTRASOUND, UPPER  GI TRACT, ENDOSCOPIC;  Surgeon: Julian Rodriguez MD;  Location: [facility] ENDO;  Service: Endoscopy;  Laterality: Left;  10 mg Reglan 1 hour prior procedure    EPIDURAL BLOCK INJECTION      lumbar    ESOPHAGOGASTRODUODENOSCOPY N/A 1/2/2024    Procedure: EGD (ESOPHAGOGASTRODUODENOSCOPY);  Surgeon: Julian Rodriguez MD;  Location: [facility] ENDO;  Service: Endoscopy;  Laterality: N/A;    ESOPHAGOGASTRODUODENOSCOPY N/A 1/3/2024    Procedure: EGD (ESOPHAGOGASTRODUODENOSCOPY);  Surgeon: Julian Rodriguez MD;  Location: [facility] ENDO;  Service: Endoscopy;  Laterality: N/A;    KIDNEY SURGERY Left     when patient was [age], kidney on the left only functions at 20% patient states    LAPAROSCOPIC GASTRIC BANDING  2010    SLEEVE GASTROPLASTY  2018     Family History   Problem Relation Age of Onset    Cancer Mother     Diabetes Mother     COPD Father     Heart disease Father     Asthma Sister      Review of patient's allergies indicates:   Allergen Reactions    Ether      Pt reports stop breathing     Penicillins      Unknown reaction      Social Determinants of Health     Tobacco Use: High Risk (1/10/2024)    Patient History     Smoking Tobacco Use: Former     Smokeless Tobacco Use: Current     Passive Exposure: Not on file   Alcohol Use: Not on file   Financial Resource Strain: Not on file   Food Insecurity: No Food Insecurity (12/29/2023)    Hunger Vital Sign     Worried About Running Out of Food in the Last Year: Never true     Ran Out of Food in the Last Year: Never true   Transportation Needs: No Transportation Needs (1/6/2024)    OASIS : Transportation     Lack of Transportation (Medical): No     Lack of Transportation (Non-Medical): No     Patient Unable or Declines to Respond: No   Physical Activity: Not on file   Stress: Not on file   Social Connections: Feeling Socially Integrated (1/6/2024)    OASIS : Social Isolation     Frequency of experiencing loneliness or isolation: Never   Housing Stability: Low  Risk  (12/29/2023)    Housing Stability Vital Sign     Unable to Pay for Housing in the Last Year: No     Number of Places Lived in the Last Year: 1     Unstable Housing in the Last Year: No   Depression: Low Risk  (5/24/2023)    Depression     Last PHQ-4: Flowsheet Data: 0      The Modified Caregiver Strain Index (MCSI) -   No data recorded   No data recorded   No data recorded   No data recorded   No data recorded   No data recorded   No data recorded   No data recorded   No data recorded   No data recorded   No data recorded   No data recorded   No data recorded   No data recorded       OBJECTIVE:     Physical Exam:  Vitals: Temp: 96.9 °F (36.1 °C) (01/10/24 1340)  Pulse: 98 (01/10/24 1340)  Resp: 16 (01/10/24 1340)  BP: 102/70 (01/10/24 1340)  SpO2: 99 % (01/10/24 1340)  Physical Exam  Constitutional:       Appearance: He is ill-appearing.   HENT:      Head: Normocephalic.      Mouth/Throat:      Mouth: Mucous membranes are moist.   Pulmonary:      Effort: Pulmonary effort is normal.   Abdominal:      General: There is no distension.   Musculoskeletal:      Cervical back: Normal range of motion.      Right lower leg: Edema present.      Left lower leg: Edema present.   Skin:     General: Skin is warm and dry.      Coloration: Skin is pale.   Neurological:      Mental Status: He is alert and oriented to person, place, and time.   Psychiatric:         Mood and Affect: Mood normal.           Review of Symptoms      Symptom Assessment (ESAS 0-10 Scale)  Pain:  0  Dyspnea:  5  Anxiety:  0  Nausea:  0  Depression:  0  Anorexia:  5  Fatigue:  5  Insomnia:  10  Restlessness:  0  Agitation:  0         ECOG Performance Status rdGrdrrdarddrderd:rd rd3rd Living Arrangements:  Lives with spouse    Psychosocial/Cultural:   See Palliative Psychosocial Note: Yes  **Primary  to Follow**  Palliative Care  Consult: No      Advance Care Planning   Advance Directives:   Living Will: No    LaPOST: No    Do Not Resuscitate  Status: No      Decision Making:  Patient answered questions  Goals of Care: The patient endorses that what is most important right now is to focus on symptom/pain control    Accordingly, we have decided that the best plan to meet the patient's goals includes continuing with treatment            Medications:    Current Outpatient Medications:     albuterol (PROAIR HFA) 90 mcg/actuation inhaler, Inhale 2 puffs into the lungs every 6 (six) hours as needed for Wheezing. Rescue, Disp: 18 g, Rfl: 1    amitriptyline (ELAVIL) 50 MG tablet, Take 50 mg by mouth every evening., Disp: , Rfl:     aspirin (ECOTRIN) 81 MG EC tablet, Take 1 tablet (81 mg total) by mouth every evening. HOLD MEDICINE UNTIL 1 day after Pleurex catheter on Left side placed after DC then ok to resume (Patient taking differently: Take 1 tablet by mouth every evening. Indications: treatment to prevent a heart attack), Disp: , Rfl: 0    ezetimibe (ZETIA) 10 mg tablet, Take 1 tablet (10 mg total) by mouth once daily. (Patient taking differently: Take 10 mg by mouth every evening.), Disp: 90 tablet, Rfl: 4    glucosamine-chondroitin 500-400 mg tablet, Take 1 tablet by mouth every evening., Disp: , Rfl:     lactulose (CHRONULAC) 20 gram/30 mL Soln, Take 30 mLs (20 g total) by mouth 2 (two) times daily as needed (constipation)., Disp: 600 mL, Rfl: 0    linaCLOtide (LINZESS) 145 mcg Cap capsule, Take 145 mcg by mouth daily as needed (constipation). **hold until after placement of left side pleurx, Disp: , Rfl:     metoprolol succinate (TOPROL-XL) 25 MG 24 hr tablet, TAKE 1 TABLET (25 MG TOTAL) EVERY EVENING. (Patient taking differently: Take 25 mg by mouth every evening.), Disp: 90 tablet, Rfl: 3    multivit-min/FA/lycopen/lutein (CENTRUM SILVER ULTRA MEN'S ORAL), Take 1 tablet by mouth every evening. **hold until after placement of left side pleurx, Disp: , Rfl:     nitroGLYCERIN (NITROSTAT) 0.4 MG SL tablet, Place 1 tablet (0.4 mg total) under the tongue  every 5 (five) minutes as needed for Chest pain., Disp: 25 tablet, Rfl: 4    ondansetron (ZOFRAN) 4 MG tablet, Take 1 tablet (4 mg total) by mouth every 4 (four) hours as needed for Nausea., Disp: 30 tablet, Rfl: 0    oxyCODONE-acetaminophen (PERCOCET)  mg per tablet, Take 1 tablet by mouth every 4 (four) hours as needed (moderate pain)., Disp: 84 tablet, Rfl: 0    pantoprazole (PROTONIX) 40 MG tablet, Take 1 tablet (40 mg total) by mouth once daily. (Patient taking differently: Take 40 mg by mouth every evening.), Disp: 90 tablet, Rfl: 3    rosuvastatin (CRESTOR) 5 MG tablet, TAKE 1 TABLET (5 MG TOTAL) EVERY EVENING. (Patient taking differently: Take 5 mg by mouth every evening.), Disp: 90 tablet, Rfl: 3    senna-docusate 8.6-50 mg (PERICOLACE) 8.6-50 mg per tablet, Take 2 tablets by mouth every evening. Hold for loose stools, Disp: 60 tablet, Rfl: 0    sucralfate (CARAFATE) 1 gram tablet, Take 1 tablet (1 g total) by mouth 3 (three) times daily., Disp: 90 tablet, Rfl: 0    tamsulosin (FLOMAX) 0.4 mg Cp24, Take 0.4 mg by mouth every evening., Disp: , Rfl:     zolpidem (AMBIEN) 10 mg Tab, Take 10 mg by mouth every evening., Disp: , Rfl:     baclofen (LIORESAL) 5 mg Tab tablet, Take 2 tablets (10 mg total) by mouth 3 (three) times daily as needed (HICCUPS)., Disp: 90 tablet, Rfl: 0    diphenhydrAMINE-aluminum-magnesium hydroxide-simethicone-LIDOcaine viscous HCl 2%, Swish and spit 10 mLs every 4 (four) hours as needed (mouth sores)., Disp: 1 each, Rfl: 0    gabapentin (NEURONTIN) 300 MG capsule, Take 1 capsule (300 mg total) by mouth every evening., Disp: 30 capsule, Rfl: 11    LIDOcaine viscous HCl 2% (LIDOCAINE VISCOUS) Soln, by Mucous Membrane route every 6 (six) hours., Disp: 20 mL, Rfl: 0    metoclopramide HCl (REGLAN) 10 MG tablet, Take 1 tablet (10 mg total) by mouth every 6 (six) hours as needed (nausea)., Disp: 120 tablet, Rfl: 0    OXYGEN-AIR DELIVERY SYSTEMS MISC, 2-3 L by Nasal route continuous.  Indications: supplemental oxygen, Disp: , Rfl:   No current facility-administered medications for this visit.    Facility-Administered Medications Ordered in Other Visits:     dextrose 50% injection 12.5 g, 12.5 g, Intravenous, PRN, Volpi-Abadie, Jacqueline, MD    dextrose 50% injection 25 g, 25 g, Intravenous, PRN, Volpi-Abadie, Jacqueline, MD    insulin regular injection 0-8 Units, 0-8 Units, Intravenous, PRN, Volpi-Abadie, Jacqueline, MD    Labs:  CBC:   WBC   Date Value Ref Range Status   01/05/2024 12.92 (H) 3.90 - 12.70 K/uL Final     Hemoglobin   Date Value Ref Range Status   01/05/2024 11.9 (L) 14.0 - 18.0 g/dL Final     Hematocrit   Date Value Ref Range Status   01/05/2024 37.2 (L) 40.0 - 54.0 % Final     MCV   Date Value Ref Range Status   01/05/2024 85 82 - 98 fL Final     Platelets   Date Value Ref Range Status   01/05/2024 169 150 - 450 K/uL Final       LFT:   Lab Results   Component Value Date    AST 56 01/05/2024    ALKPHOS 368 (H) 01/05/2024    BILITOT 0.6 01/05/2024       Albumin:   Albumin   Date Value Ref Range Status   01/05/2024 2.6 (L) 3.5 - 5.2 g/dL Final     Protein:   Total Protein   Date Value Ref Range Status   01/05/2024 5.4 (L) 6.0 - 8.4 g/dL Final       Radiology:None      60 minutes of total time spent on the encounter, which includes face to face time and non-face to face time preparing to see the patient (eg, review of tests), Obtaining and/or reviewing separately obtained history, Documenting clinical information in the electronic or other health record, Independently interpreting results if documented above (not separately reported) and communicating results to the patient/family/caregiver, or Care coordination (not separately reported).    10 minutes spent in discussing ACP    Signature: Bernie Horowitz NP

## 2024-01-11 NOTE — NURSING
Reviewed chart, messaged Dr. Kenny staff for urgent port placement and messaged Infusion for urgent chemo education.  Awaiting response to schedule.

## 2024-01-11 NOTE — TELEPHONE ENCOUNTER
----- Message from Kassandra Silva RN sent at 1/11/2024  4:27 PM CST -----  Regarding: Port Placement Urgent  Hey the attached pt has a referral in for port placement ASAP, can you please get him in as quickly as possible?      Thank you,    Kassandra Silva, RN, BSN  RN Oncology Navigator    [facility]  [phone] (P)  [phone] (F)

## 2024-01-12 NOTE — TELEPHONE ENCOUNTER
----- Message from Kassandra Silva RN sent at 1/11/2024  4:27 PM CST -----  Regarding: Port Placement Urgent  Hey the attached pt has a referral in for port placement ASAP, can you please get him in as quickly as possible?      Thank you,    Kassandra Silva, RN, BSN  RN Oncology Navigator    [facility]  [phone] (P)  [phone] (F)

## 2024-01-12 NOTE — TELEPHONE ENCOUNTER
----- Message from Kassandra Silva RN sent at 1/11/2024  4:27 PM CST -----  Regarding: Port Placement Urgent  Hey the attached pt has a referral in for port placement ASAP, can you please get him in as quickly as possible?      Thank you,    Kassandra Silva, RN, BSN  RN Oncology Navigator    [facility]  [phone] (P)  [phone] (F)

## 2024-01-12 NOTE — NURSING
Spoke with pt's wife, reviewed time/dates/locations verbalized agreement. Port placement expedited 1/17, tx 1/22,    Reviewed chart, spoke with Dr. Kenny office regarding the port placement, spoke with infusion regarding treatment, all appointments in place.

## 2024-01-13 PROBLEM — R65.10 SIRS (SYSTEMIC INFLAMMATORY RESPONSE SYNDROME): Status: ACTIVE | Noted: 2024-01-01

## 2024-01-13 PROBLEM — J45.909 ASTHMA: Status: ACTIVE | Noted: 2024-01-01

## 2024-01-13 PROBLEM — C16.9 GASTRIC ADENOCARCINOMA: Status: ACTIVE | Noted: 2024-01-01

## 2024-01-13 PROBLEM — E87.5 HYPERKALEMIA: Status: ACTIVE | Noted: 2024-01-01

## 2024-01-13 PROBLEM — E87.20 METABOLIC ACIDOSIS: Status: ACTIVE | Noted: 2024-01-01

## 2024-01-13 PROBLEM — E46 MALNUTRITION: Status: ACTIVE | Noted: 2024-01-01

## 2024-01-13 PROBLEM — R60.1 ANASARCA: Status: ACTIVE | Noted: 2024-01-01

## 2024-01-13 PROBLEM — E86.0 DEHYDRATION: Status: ACTIVE | Noted: 2024-01-01

## 2024-01-13 PROBLEM — Z99.81 ON HOME O2: Status: ACTIVE | Noted: 2024-01-01

## 2024-01-13 PROBLEM — R73.9 HYPERGLYCEMIA: Status: ACTIVE | Noted: 2024-01-01

## 2024-01-13 PROBLEM — E87.1 HYPONATREMIA: Status: ACTIVE | Noted: 2024-01-01

## 2024-01-13 PROBLEM — E83.39 HYPERPHOSPHATEMIA: Status: ACTIVE | Noted: 2024-01-01

## 2024-01-13 NOTE — TELEPHONE ENCOUNTER
Unable to reach for requested callback. Requested callback number not listed as any contact t number in chart, so VM not left.     Reason for Disposition   Second attempt to contact caller AND no contact made. Phone number verified.    Protocols used: No Contact or Duplicate Contact Call-A-AH

## 2024-01-14 PROBLEM — Z51.5 COMFORT MEASURES ONLY STATUS: Status: ACTIVE | Noted: 2024-01-01

## 2024-01-14 PROBLEM — E43 SEVERE MALNUTRITION: Status: ACTIVE | Noted: 2024-01-01

## 2024-01-15 PROBLEM — E46 MALNUTRITION: Status: RESOLVED | Noted: 2024-01-01 | Resolved: 2024-01-01

## 2024-01-15 PROBLEM — R65.10 SIRS (SYSTEMIC INFLAMMATORY RESPONSE SYNDROME): Status: RESOLVED | Noted: 2024-01-01 | Resolved: 2024-01-01

## 2024-01-15 PROBLEM — E86.0 DEHYDRATION: Status: RESOLVED | Noted: 2024-01-01 | Resolved: 2024-01-01

## 2024-01-15 PROBLEM — G89.3 CANCER ASSOCIATED PAIN: Status: ACTIVE | Noted: 2024-01-01

## 2024-01-15 PROBLEM — R73.9 HYPERGLYCEMIA: Status: RESOLVED | Noted: 2024-01-01 | Resolved: 2024-01-01

## 2024-01-15 NOTE — PROGRESS NOTES
Reviewed chart, pt admitted on 01/13 n/v hypok+, possible discharge today 01/15, will continue to follow. Scheduled for port placement 1/17 and start tx 1/24.

## 2024-01-16 NOTE — TELEPHONE ENCOUNTER
----- Message from Bharati Mcmanus sent at 1/16/2024 12:43 PM CST -----  Type: Needs Medical Advice  Who Called:  Shu  Symptoms (please be specific):    How long has patient had these symptoms:    Pharmacy name and phone #:    Best Call Back Number:[phone]  Additional Information: Shu is requesting a call back from Kassandra regarding her .

## 2024-01-16 NOTE — TELEPHONE ENCOUNTER
Good morning,     Mr. Koo was recently hospitalized for kidney failure. Per his wife, his oncologist states he is not a candidate for chemo. She would like to cancel his port insertion scheduled with Dr. Kenny tomorrow. Case will be placed in depot by ERNESTINE Rosenthal at this time.     Thank you!

## 2024-01-16 NOTE — NURSING
Returned call to pt's wife about canceling appointments.  Pt is now on hospice, all appt's canceled.

## 2024-01-17 NOTE — PATIENT INSTRUCTIONS
Hot flashes  -     gabapentin (NEURONTIN) 300 MG capsule; Take 1 capsule (300 mg total) by mouth every evening.      Hiccups  -     baclofen (LIORESAL) 5 mg Tab tablet; Take 2 tablets (10 mg total) by mouth 3 (three) times daily as needed (HICCUPS).    Nausea and vomiting, unspecified vomiting type  -     metoclopramide HCl (REGLAN) 10 MG tablet; Take 1 tablet (10 mg total) by mouth every 6 (six) hours as needed (nausea).    Mouth sores  -     DiphenhydrAMINE-aluminum-magnesium hydroxide-simethicone-LIDOcaine viscous HCl 2%; Swish and spit 10 mLs every 4 (four) hours as needed (mouth sores).    -     LIDOcaine viscous HCl 2% (LIDOCAINE VISCOUS) Soln; by Mucous Membrane route every 6 (six) hours.